# Patient Record
Sex: FEMALE | Race: WHITE | NOT HISPANIC OR LATINO | URBAN - METROPOLITAN AREA
[De-identification: names, ages, dates, MRNs, and addresses within clinical notes are randomized per-mention and may not be internally consistent; named-entity substitution may affect disease eponyms.]

---

## 2020-03-18 ENCOUNTER — APPOINTMENT (RX ONLY)
Dept: URBAN - METROPOLITAN AREA CLINIC 23 | Facility: CLINIC | Age: 35
Setting detail: DERMATOLOGY
End: 2020-03-18

## 2020-03-18 DIAGNOSIS — D485 NEOPLASM OF UNCERTAIN BEHAVIOR OF SKIN: ICD-10-CM

## 2020-03-18 DIAGNOSIS — L82.1 OTHER SEBORRHEIC KERATOSIS: ICD-10-CM

## 2020-03-18 DIAGNOSIS — Z80.8 FAMILY HISTORY OF MALIGNANT NEOPLASM OF OTHER ORGANS OR SYSTEMS: ICD-10-CM

## 2020-03-18 DIAGNOSIS — D22 MELANOCYTIC NEVI: ICD-10-CM

## 2020-03-18 PROBLEM — D48.5 NEOPLASM OF UNCERTAIN BEHAVIOR OF SKIN: Status: ACTIVE | Noted: 2020-03-18

## 2020-03-18 PROBLEM — D22.5 MELANOCYTIC NEVI OF TRUNK: Status: ACTIVE | Noted: 2020-03-18

## 2020-03-18 PROCEDURE — ? PATIENT SPECIFIC COUNSELING

## 2020-03-18 PROCEDURE — 11300 SHAVE SKIN LESION 0.5 CM/<: CPT

## 2020-03-18 PROCEDURE — ? SHAVE REMOVAL

## 2020-03-18 PROCEDURE — ? COUNSELING

## 2020-03-18 PROCEDURE — 11300 SHAVE SKIN LESION 0.5 CM/<: CPT | Mod: 76

## 2020-03-18 PROCEDURE — 99203 OFFICE O/P NEW LOW 30 MIN: CPT | Mod: 25

## 2020-03-18 ASSESSMENT — LOCATION SIMPLE DESCRIPTION DERM
LOCATION SIMPLE: RIGHT UPPER BACK
LOCATION SIMPLE: RIGHT UPPER BACK
LOCATION SIMPLE: CHEST
LOCATION SIMPLE: LEFT UPPER BACK
LOCATION SIMPLE: LEFT CHEEK
LOCATION SIMPLE: CHEST

## 2020-03-18 ASSESSMENT — LOCATION DETAILED DESCRIPTION DERM
LOCATION DETAILED: RIGHT MEDIAL UPPER BACK
LOCATION DETAILED: LEFT SUPERIOR CENTRAL MALAR CHEEK
LOCATION DETAILED: RIGHT MEDIAL UPPER BACK
LOCATION DETAILED: RIGHT SUPERIOR UPPER BACK
LOCATION DETAILED: LEFT SUPERIOR MEDIAL UPPER BACK
LOCATION DETAILED: UPPER STERNUM
LOCATION DETAILED: UPPER STERNUM

## 2020-03-18 ASSESSMENT — LOCATION ZONE DERM
LOCATION ZONE: FACE
LOCATION ZONE: TRUNK
LOCATION ZONE: TRUNK

## 2020-03-18 NOTE — PROCEDURE: SHAVE REMOVAL
Detail Level: Detailed
Medical Necessity Clause: This procedure was medically necessary because the lesion that was treated was:
Hemostasis: Aluminum Chloride
Anesthesia Type: 1% lidocaine with epinephrine
Bill 56641 For Specimen Handling/Conveyance To Laboratory?: no
Medical Necessity Information: It is in your best interest to select a reason for this procedure from the list below. All of these items fulfill various CMS LCD requirements except the new and changing color options.
X Size Of Lesion In Cm (Optional): 0
Wound Care: Vaseline
Notification Instructions: Patient will be notified of biopsy results. However, patient instructed to call the office if not contacted within 2 weeks.
Biopsy Method: Dermablade
Billing Type: Third-Party Bill
Path Notes (To The Dermatopathologist): Please check margins.
Render Post-Care Instructions In Note?: yes
Size Of Lesion In Cm (Required): 0.4
Consent was obtained from the patient. The risks and benefits to therapy were discussed in detail. Specifically, the risks of infection, scarring, bleeding, prolonged wound healing, incomplete removal, allergy to anesthesia, nerve injury and recurrence were addressed. Prior to the procedure, the treatment site was clearly identified and confirmed by the patient. All components of Universal Protocol/PAUSE Rule completed.
Post-Care Instructions: I reviewed with the patient in detail post-care instructions. Patient is to keep the biopsy site dry overnight, and then apply Vaseline twice daily until healed. Patient may apply dilute white vinegar (1:10; white vinegar:water) soaks to remove any crusting.
Size Of Lesion In Cm (Required): 0.5

## 2020-03-18 NOTE — PROCEDURE: PATIENT SPECIFIC COUNSELING
Detail Level: Zone
Discussed removal vs monitoring
Can treat as cosmetic but risk of pigment change.
Shave removal on neck was discussed but she declines

## 2020-03-30 ENCOUNTER — APPOINTMENT (RX ONLY)
Dept: URBAN - METROPOLITAN AREA CLINIC 23 | Facility: CLINIC | Age: 35
Setting detail: DERMATOLOGY
End: 2020-03-30

## 2020-03-30 DIAGNOSIS — D22 MELANOCYTIC NEVI: ICD-10-CM

## 2020-03-30 PROBLEM — D22.5 MELANOCYTIC NEVI OF TRUNK: Status: ACTIVE | Noted: 2020-03-30

## 2020-03-30 PROCEDURE — 12032 INTMD RPR S/A/T/EXT 2.6-7.5: CPT

## 2020-03-30 PROCEDURE — ? EXCISION

## 2020-03-30 PROCEDURE — 11402 EXC TR-EXT B9+MARG 1.1-2 CM: CPT

## 2020-03-30 ASSESSMENT — LOCATION DETAILED DESCRIPTION DERM: LOCATION DETAILED: RIGHT MEDIAL UPPER BACK

## 2020-03-30 ASSESSMENT — LOCATION SIMPLE DESCRIPTION DERM: LOCATION SIMPLE: RIGHT UPPER BACK

## 2020-03-30 ASSESSMENT — LOCATION ZONE DERM: LOCATION ZONE: TRUNK

## 2020-03-30 NOTE — PROCEDURE: EXCISION
Wound Care: Vaseline
Ftsg Text: The defect edges were debeveled with a #15 scalpel blade.  Given the location of the defect, shape of the defect and the proximity to free margins a full thickness skin graft was deemed most appropriate.  Using a sterile surgical marker, the primary defect shape was transferred to the donor site. The area thus outlined was incised deep to adipose tissue with a #15 scalpel blade.  The harvested graft was then trimmed of adipose tissue until only dermis and epidermis was left.  The skin margins of the secondary defect were undermined to an appropriate distance in all directions utilizing iris scissors.  The secondary defect was closed with interrupted buried subcutaneous sutures.  The skin edges were then re-apposed with running  sutures.  The skin graft was then placed in the primary defect and oriented appropriately.
Nostril Rim Text: The closure involved the nostril rim.
Rotation Flap Text: The defect edges were debeveled with a #15 scalpel blade.  Given the location of the defect, shape of the defect and the proximity to free margins a rotation flap was deemed most appropriate.  Using a sterile surgical marker, an appropriate rotation flap was drawn incorporating the defect and placing the expected incisions within the relaxed skin tension lines where possible.    The area thus outlined was incised deep to adipose tissue with a #15 scalpel blade.  The skin margins were undermined to an appropriate distance in all directions utilizing iris scissors.
Information: Selecting Yes will display possible errors in your note based on the variables you have selected. This validation is only offered as a suggestion for you. PLEASE NOTE THAT THE VALIDATION TEXT WILL BE REMOVED WHEN YOU FINALIZE YOUR NOTE. IF YOU WANT TO FAX A PRELIMINARY NOTE YOU WILL NEED TO TOGGLE THIS TO 'NO' IF YOU DO NOT WANT IT IN YOUR FAXED NOTE.
Secondary Defect Length (In Cm): 0
Estimated Blood Loss (Cc): minimal
Add Nub Associated Diagnoses If Applicable When Selecting Medical Necessity: No
Rhomboid Transposition Flap Text: The defect edges were debeveled with a #15 scalpel blade.  Given the location of the defect and the proximity to free margins a rhomboid transposition flap was deemed most appropriate.  Using a sterile surgical marker, an appropriate rhomboid flap was drawn incorporating the defect.    The area thus outlined was incised deep to adipose tissue with a #15 scalpel blade.  The skin margins were undermined to an appropriate distance in all directions utilizing iris scissors.
Tissue Cultured Epidermal Autograft Text: The defect edges were debeveled with a #15 scalpel blade.  Given the location of the defect, shape of the defect and the proximity to free margins a tissue cultured epidermal autograft was deemed most appropriate.  The graft was then trimmed to fit the size of the defect.  The graft was then placed in the primary defect and oriented appropriately.
Additional Anesthesia Volume In Cc: 6
Crescentic Intermediate Repair Preamble Text (Leave Blank If You Do Not Want): Undermining was performed with blunt dissection.
Complex Repair And A-T Advancement Flap Text: The defect edges were debeveled with a #15 scalpel blade.  The primary defect was closed partially with a complex linear closure.  Given the location of the remaining defect, shape of the defect and the proximity to free margins an A-T advancement flap was deemed most appropriate for complete closure of the defect.  Using a sterile surgical marker, an appropriate advancement flap was drawn incorporating the defect and placing the expected incisions within the relaxed skin tension lines where possible.    The area thus outlined was incised deep to adipose tissue with a #15 scalpel blade.  The skin margins were undermined to an appropriate distance in all directions utilizing iris scissors.
Bilobed Transposition Flap Text: The defect edges were debeveled with a #15 scalpel blade.  Given the location of the defect and the proximity to free margins a bilobed transposition flap was deemed most appropriate.  Using a sterile surgical marker, an appropriate bilobe flap drawn around the defect.    The area thus outlined was incised deep to adipose tissue with a #15 scalpel blade.  The skin margins were undermined to an appropriate distance in all directions utilizing iris scissors.
Medical Necessity Clause: This procedure was medically necessary because the lesion that was treated was:
Repair Performed By Another Provider Text (Leave Blank If You Do Not Want): After the tissue was excised the defect was repaired by another provider.
Complex Repair And W Plasty Text: The defect edges were debeveled with a #15 scalpel blade.  The primary defect was closed partially with a complex linear closure.  Given the location of the remaining defect, shape of the defect and the proximity to free margins a W plasty was deemed most appropriate for complete closure of the defect.  Using a sterile surgical marker, an appropriate advancement flap was drawn incorporating the defect and placing the expected incisions within the relaxed skin tension lines where possible.    The area thus outlined was incised deep to adipose tissue with a #15 scalpel blade.  The skin margins were undermined to an appropriate distance in all directions utilizing iris scissors.
Graft Donor Site Bandage (Optional-Leave Blank If You Don't Want In Note): Steri-strips and a pressure bandage were applied to the donor site.
Keystone Flap Text: The defect edges were debeveled with a #15 scalpel blade.  Given the location of the defect, shape of the defect a keystone flap was deemed most appropriate.  Using a sterile surgical marker, an appropriate keystone flap was drawn incorporating the defect, outlining the appropriate donor tissue and placing the expected incisions within the relaxed skin tension lines where possible. The area thus outlined was incised deep to adipose tissue with a #15 scalpel blade.  The skin margins were undermined to an appropriate distance in all directions around the primary defect and laterally outward around the flap utilizing iris scissors.
Complex Repair And Transposition Flap Text: The defect edges were debeveled with a #15 scalpel blade.  The primary defect was closed partially with a complex linear closure.  Given the location of the remaining defect, shape of the defect and the proximity to free margins a transposition flap was deemed most appropriate for complete closure of the defect.  Using a sterile surgical marker, an appropriate advancement flap was drawn incorporating the defect and placing the expected incisions within the relaxed skin tension lines where possible.    The area thus outlined was incised deep to adipose tissue with a #15 scalpel blade.  The skin margins were undermined to an appropriate distance in all directions utilizing iris scissors.
Home Suture Removal Text: Patient was provided a home suture removal kit and will remove their sutures at home.  If they have any questions or difficulties they will call the office.
A-T Advancement Flap Text: The defect edges were debeveled with a #15 scalpel blade.  Given the location of the defect, shape of the defect and the proximity to free margins an A-T advancement flap was deemed most appropriate.  Using a sterile surgical marker, an appropriate advancement flap was drawn incorporating the defect and placing the expected incisions within the relaxed skin tension lines where possible.    The area thus outlined was incised deep to adipose tissue with a #15 scalpel blade.  The skin margins were undermined to an appropriate distance in all directions utilizing iris scissors.
Medical Necessity Information: It is in your best interest to select a reason for this procedure from the list below. All of these items fulfill various CMS LCD requirements except lesion extends to a margin.
Transposition Flap Text: The defect edges were debeveled with a #15 scalpel blade.  Given the location of the defect and the proximity to free margins a transposition flap was deemed most appropriate.  Using a sterile surgical marker, an appropriate transposition flap was drawn incorporating the defect.    The area thus outlined was incised deep to adipose tissue with a #15 scalpel blade.  The skin margins were undermined to an appropriate distance in all directions utilizing iris scissors.
Composite Graft Text: The defect edges were debeveled with a #15 scalpel blade.  Given the location of the defect, shape of the defect, the proximity to free margins and the fact the defect was full thickness a composite graft was deemed most appropriate.  The defect was outline and then transferred to the donor site.  A full thickness graft was then excised from the donor site. The graft was then placed in the primary defect, oriented appropriately and then sutured into place.  The secondary defect was then repaired using a primary closure.
Show Accession Variable: Yes
Bilateral Helical Rim Advancement Flap Text: The defect edges were debeveled with a #15 blade scalpel.  Given the location of the defect and the proximity to free margins (helical rim) a bilateral helical rim advancement flap was deemed most appropriate.  Using a sterile surgical marker, the appropriate advancement flaps were drawn incorporating the defect and placing the expected incisions between the helical rim and antihelix where possible.  The area thus outlined was incised through and through with a #15 scalpel blade.  With a skin hook and iris scissors, the flaps were gently and sharply undermined and freed up.
Crescentic Complex Repair Preamble Text (Leave Blank If You Do Not Want): Extensive wide undermining was performed.
Slit Excision Additional Text (Leave Blank If You Do Not Want): A linear line was drawn on the skin overlying the lesion. An incision was made slowly until the lesion was visualized.  Once visualized, the lesion was removed with blunt dissection.
Purse String (Simple) Text: Given the location of the defect and the characteristics of the surrounding skin a purse string simple closure was deemed most appropriate.  Undermining was performed circumferentially around the surgical defect.  A purse string suture was then placed and tightened.
Advancement Flap (Double) Text: The defect edges were debeveled with a #15 scalpel blade.  Given the location of the defect and the proximity to free margins a double advancement flap was deemed most appropriate.  Using a sterile surgical marker, the appropriate advancement flaps were drawn incorporating the defect and placing the expected incisions within the relaxed skin tension lines where possible.    The area thus outlined was incised deep to adipose tissue with a #15 scalpel blade.  The skin margins were undermined to an appropriate distance in all directions utilizing iris scissors.
Double O-Z Plasty Text: The defect edges were debeveled with a #15 scalpel blade.  Given the location of the defect, shape of the defect and the proximity to free margins a Double O-Z plasty (double transposition flap) was deemed most appropriate.  Using a sterile surgical marker, the appropriate transposition flaps were drawn incorporating the defect and placing the expected incisions within the relaxed skin tension lines where possible. The area thus outlined was incised deep to adipose tissue with a #15 scalpel blade.  The skin margins were undermined to an appropriate distance in all directions utilizing iris scissors.  Hemostasis was achieved with electrocautery.  The flaps were then transposed into place, one clockwise and the other counterclockwise, and anchored with interrupted buried subcutaneous sutures.
Complex Repair And Ftsg Text: The defect edges were debeveled with a #15 scalpel blade.  The primary defect was closed partially with a complex linear closure.  Given the location of the defect, shape of the defect and the proximity to free margins a full thickness skin graft was deemed most appropriate to repair the remaining defect.  The graft was trimmed to fit the size of the remaining defect.  The graft was then placed in the primary defect, oriented appropriately, and sutured into place.
Island Pedicle Flap Text: The defect edges were debeveled with a #15 scalpel blade.  Given the location of the defect, shape of the defect and the proximity to free margins an island pedicle advancement flap was deemed most appropriate.  Using a sterile surgical marker, an appropriate advancement flap was drawn incorporating the defect, outlining the appropriate donor tissue and placing the expected incisions within the relaxed skin tension lines where possible.    The area thus outlined was incised deep to adipose tissue with a #15 scalpel blade.  The skin margins were undermined to an appropriate distance in all directions around the primary defect and laterally outward around the island pedicle utilizing iris scissors.  There was minimal undermining beneath the pedicle flap.
Anesthesia Type: 1% lidocaine with 1:100,000 epinephrine and 408mcg clindamycin/ml and a 1:10 solution of 8.4% sodium bicarbonate
Complex Repair And Bilobe Flap Text: The defect edges were debeveled with a #15 scalpel blade.  The primary defect was closed partially with a complex linear closure.  Given the location of the remaining defect, shape of the defect and the proximity to free margins a bilobe flap was deemed most appropriate for complete closure of the defect.  Using a sterile surgical marker, an appropriate advancement flap was drawn incorporating the defect and placing the expected incisions within the relaxed skin tension lines where possible.    The area thus outlined was incised deep to adipose tissue with a #15 scalpel blade.  The skin margins were undermined to an appropriate distance in all directions utilizing iris scissors.
Cheek-To-Nose Interpolation Flap Text: A decision was made to reconstruct the defect utilizing an interpolation axial flap and a staged reconstruction.  A telfa template was made of the defect.  This telfa template was then used to outline the Cheek-To-Nose Interpolation flap.  The donor area for the pedicle flap was then injected with anesthesia.  The flap was excised through the skin and subcutaneous tissue down to the layer of the underlying musculature.  The interpolation flap was carefully excised within this deep plane to maintain its blood supply.  The edges of the donor site were undermined.   The donor site was closed in a primary fashion.  The pedicle was then rotated into position and sutured.  Once the tube was sutured into place, adequate blood supply was confirmed with blanching and refill.  The pedicle was then wrapped with xeroform gauze and dressed appropriately with a telfa and gauze bandage to ensure continued blood supply and protect the attached pedicle.
O-Z Flap Text: The defect edges were debeveled with a #15 scalpel blade.  Given the location of the defect, shape of the defect and the proximity to free margins an O-Z flap was deemed most appropriate.  Using a sterile surgical marker, an appropriate transposition flap was drawn incorporating the defect and placing the expected incisions within the relaxed skin tension lines where possible. The area thus outlined was incised deep to adipose tissue with a #15 scalpel blade.  The skin margins were undermined to an appropriate distance in all directions utilizing iris scissors.
Epidermal Closure Graft Donor Site (Optional): simple interrupted
Rhombic Flap Text: The defect edges were debeveled with a #15 scalpel blade.  Given the location of the defect and the proximity to free margins a rhombic flap was deemed most appropriate.  Using a sterile surgical marker, an appropriate rhombic flap was drawn incorporating the defect.    The area thus outlined was incised deep to adipose tissue with a #15 scalpel blade.  The skin margins were undermined to an appropriate distance in all directions utilizing iris scissors.
Skin Substitute Text: The defect edges were debeveled with a #15 scalpel blade.  Given the location of the defect, shape of the defect and the proximity to free margins a skin substitute graft was deemed most appropriate.  The graft material was trimmed to fit the size of the defect. The graft was then placed in the primary defect and oriented appropriately.
Billing Type: Third-Party Bill
Bilobed Flap Text: The defect edges were debeveled with a #15 scalpel blade.  Given the location of the defect and the proximity to free margins a bilobe flap was deemed most appropriate.  Using a sterile surgical marker, an appropriate bilobe flap drawn around the defect.    The area thus outlined was incised deep to adipose tissue with a #15 scalpel blade.  The skin margins were undermined to an appropriate distance in all directions utilizing iris scissors.
Complex Repair And Modified Advancement Flap Text: The defect edges were debeveled with a #15 scalpel blade.  The primary defect was closed partially with a complex linear closure.  Given the location of the remaining defect, shape of the defect and the proximity to free margins a modified advancement flap was deemed most appropriate for complete closure of the defect.  Using a sterile surgical marker, an appropriate advancement flap was drawn incorporating the defect and placing the expected incisions within the relaxed skin tension lines where possible.    The area thus outlined was incised deep to adipose tissue with a #15 scalpel blade.  The skin margins were undermined to an appropriate distance in all directions utilizing iris scissors.
Complex Repair And Double M Plasty Text: The defect edges were debeveled with a #15 scalpel blade.  The primary defect was closed partially with a complex linear closure.  Given the location of the remaining defect, shape of the defect and the proximity to free margins a double M plasty was deemed most appropriate for complete closure of the defect.  Using a sterile surgical marker, an appropriate advancement flap was drawn incorporating the defect and placing the expected incisions within the relaxed skin tension lines where possible.    The area thus outlined was incised deep to adipose tissue with a #15 scalpel blade.  The skin margins were undermined to an appropriate distance in all directions utilizing iris scissors.
Island Pedicle Flap-Requiring Vessel Identification Text: The defect edges were debeveled with a #15 scalpel blade.  Given the location of the defect, shape of the defect and the proximity to free margins an island pedicle advancement flap was deemed most appropriate.  Using a sterile surgical marker, an appropriate advancement flap was drawn, based on the axial vessel mentioned above, incorporating the defect, outlining the appropriate donor tissue and placing the expected incisions within the relaxed skin tension lines where possible.    The area thus outlined was incised deep to adipose tissue with a #15 scalpel blade.  The skin margins were undermined to an appropriate distance in all directions around the primary defect and laterally outward around the island pedicle utilizing iris scissors.  There was minimal undermining beneath the pedicle flap.
Crescentic Advancement Flap Text: The defect edges were debeveled with a #15 scalpel blade.  Given the location of the defect and the proximity to free margins a crescentic advancement flap was deemed most appropriate.  Using a sterile surgical marker, the appropriate advancement flap was drawn incorporating the defect and placing the expected incisions within the relaxed skin tension lines where possible.    The area thus outlined was incised deep to adipose tissue with a #15 scalpel blade.  The skin margins were undermined to an appropriate distance in all directions utilizing iris scissors.
Post-Care Instructions: I reviewed with the patient in detail post-care instructions. Patient is not to engage in any heavy lifting, exercise, or swimming for the next 14 days. Should the patient develop any fevers, chills, bleeding, severe pain patient will contact the office immediately.
Excision Method: Elliptical
Complex Repair And Rhombic Flap Text: The defect edges were debeveled with a #15 scalpel blade.  The primary defect was closed partially with a complex linear closure.  Given the location of the remaining defect, shape of the defect and the proximity to free margins a rhombic flap was deemed most appropriate for complete closure of the defect.  Using a sterile surgical marker, an appropriate advancement flap was drawn incorporating the defect and placing the expected incisions within the relaxed skin tension lines where possible.    The area thus outlined was incised deep to adipose tissue with a #15 scalpel blade.  The skin margins were undermined to an appropriate distance in all directions utilizing iris scissors.
Undermining Type: Entire Wound
Debridement Text: The wound edges were debrided prior to proceeding with the closure to facilitate wound healing.
H Plasty Text: Given the location of the defect, shape of the defect and the proximity to free margins a H-plasty was deemed most appropriate for repair.  Using a sterile surgical marker, the appropriate advancement arms of the H-plasty were drawn incorporating the defect and placing the expected incisions within the relaxed skin tension lines where possible. The area thus outlined was incised deep to adipose tissue with a #15 scalpel blade. The skin margins were undermined to an appropriate distance in all directions utilizing iris scissors.  The opposing advancement arms were then advanced into place in opposite direction and anchored with interrupted buried subcutaneous sutures.
Complex Repair And Dermal Autograft Text: The defect edges were debeveled with a #15 scalpel blade.  The primary defect was closed partially with a complex linear closure.  Given the location of the defect, shape of the defect and the proximity to free margins an dermal autograft was deemed most appropriate to repair the remaining defect.  The graft was trimmed to fit the size of the remaining defect.  The graft was then placed in the primary defect, oriented appropriately, and sutured into place.
Scalpel Size: 15 blade
Mercedes Flap Text: The defect edges were debeveled with a #15 scalpel blade.  Given the location of the defect, shape of the defect and the proximity to free margins a Mercedes flap was deemed most appropriate.  Using a sterile surgical marker, an appropriate advancement flap was drawn incorporating the defect and placing the expected incisions within the relaxed skin tension lines where possible. The area thus outlined was incised deep to adipose tissue with a #15 scalpel blade.  The skin margins were undermined to an appropriate distance in all directions utilizing iris scissors.
Anesthesia Type: 1% lidocaine with epinephrine
Mastoid Interpolation Flap Text: A decision was made to reconstruct the defect utilizing an interpolation axial flap and a staged reconstruction.  A telfa template was made of the defect.  This telfa template was then used to outline the mastoid interpolation flap.  The donor area for the pedicle flap was then injected with anesthesia.  The flap was excised through the skin and subcutaneous tissue down to the layer of the underlying musculature.  The pedicle flap was carefully excised within this deep plane to maintain its blood supply.  The edges of the donor site were undermined.   The donor site was closed in a primary fashion.  The pedicle was then rotated into position and sutured.  Once the tube was sutured into place, adequate blood supply was confirmed with blanching and refill.  The pedicle was then wrapped with xeroform gauze and dressed appropriately with a telfa and gauze bandage to ensure continued blood supply and protect the attached pedicle.
Purse String (Intermediate) Text: Given the location of the defect and the characteristics of the surrounding skin a purse string intermediate closure was deemed most appropriate.  Undermining was performed circumferentially around the surgical defect.  A purse string suture was then placed and tightened.
Saucerization Excision Additional Text (Leave Blank If You Do Not Want): The margin was drawn around the clinically apparent lesion.  Incisions were then made along these lines, in a tangential fashion, to the appropriate tissue plane and the lesion was extirpated.
Helical Rim Advancement Flap Text: The defect edges were debeveled with a #15 blade scalpel.  Given the location of the defect and the proximity to free margins (helical rim) a double helical rim advancement flap was deemed most appropriate.  Using a sterile surgical marker, the appropriate advancement flaps were drawn incorporating the defect and placing the expected incisions between the helical rim and antihelix where possible.  The area thus outlined was incised through and through with a #15 scalpel blade.  With a skin hook and iris scissors, the flaps were gently and sharply undermined and freed up.
Size Of Lesion In Cm: 0.6
Complex Repair And O-L Flap Text: The defect edges were debeveled with a #15 scalpel blade.  The primary defect was closed partially with a complex linear closure.  Given the location of the remaining defect, shape of the defect and the proximity to free margins an O-L flap was deemed most appropriate for complete closure of the defect.  Using a sterile surgical marker, an appropriate flap was drawn incorporating the defect and placing the expected incisions within the relaxed skin tension lines where possible.    The area thus outlined was incised deep to adipose tissue with a #15 scalpel blade.  The skin margins were undermined to an appropriate distance in all directions utilizing iris scissors.
Retention Suture Text: Retention sutures were placed to support the closure and prevent dehiscence.
Dorsal Nasal Flap Text: The defect edges were debeveled with a #15 scalpel blade.  Given the location of the defect and the proximity to free margins a dorsal nasal flap was deemed most appropriate.  Using a sterile surgical marker, an appropriate dorsal nasal flap was drawn around the defect.    The area thus outlined was incised deep to adipose tissue with a #15 scalpel blade.  The skin margins were undermined to an appropriate distance in all directions utilizing iris scissors.
Advancement Flap (Single) Text: The defect edges were debeveled with a #15 scalpel blade.  Given the location of the defect and the proximity to free margins a single advancement flap was deemed most appropriate.  Using a sterile surgical marker, an appropriate advancement flap was drawn incorporating the defect and placing the expected incisions within the relaxed skin tension lines where possible.    The area thus outlined was incised deep to adipose tissue with a #15 scalpel blade.  The skin margins were undermined to an appropriate distance in all directions utilizing iris scissors.
Complex Repair And Dorsal Nasal Flap Text: The defect edges were debeveled with a #15 scalpel blade.  The primary defect was closed partially with a complex linear closure.  Given the location of the remaining defect, shape of the defect and the proximity to free margins a dorsal nasal flap was deemed most appropriate for complete closure of the defect.  Using a sterile surgical marker, an appropriate flap was drawn incorporating the defect and placing the expected incisions within the relaxed skin tension lines where possible.    The area thus outlined was incised deep to adipose tissue with a #15 scalpel blade.  The skin margins were undermined to an appropriate distance in all directions utilizing iris scissors.
O-Z Plasty Text: The defect edges were debeveled with a #15 scalpel blade.  Given the location of the defect, shape of the defect and the proximity to free margins an O-Z plasty (double transposition flap) was deemed most appropriate.  Using a sterile surgical marker, the appropriate transposition flaps were drawn incorporating the defect and placing the expected incisions within the relaxed skin tension lines where possible.    The area thus outlined was incised deep to adipose tissue with a #15 scalpel blade.  The skin margins were undermined to an appropriate distance in all directions utilizing iris scissors.  Hemostasis was achieved with electrocautery.  The flaps were then transposed into place, one clockwise and the other counterclockwise, and anchored with interrupted buried subcutaneous sutures.
O-L Flap Text: The defect edges were debeveled with a #15 scalpel blade.  Given the location of the defect, shape of the defect and the proximity to free margins an O-L flap was deemed most appropriate.  Using a sterile surgical marker, an appropriate advancement flap was drawn incorporating the defect and placing the expected incisions within the relaxed skin tension lines where possible.    The area thus outlined was incised deep to adipose tissue with a #15 scalpel blade.  The skin margins were undermined to an appropriate distance in all directions utilizing iris scissors.
Complex Repair And Skin Substitute Graft Text: The defect edges were debeveled with a #15 scalpel blade.  The primary defect was closed partially with a complex linear closure.  Given the location of the remaining defect, shape of the defect and the proximity to free margins a skin substitute graft was deemed most appropriate to repair the remaining defect.  The graft was trimmed to fit the size of the remaining defect.  The graft was then placed in the primary defect, oriented appropriately, and sutured into place.
Hemostasis: Electrocautery
Cheek Interpolation Flap Text: A decision was made to reconstruct the defect utilizing an interpolation axial flap and a staged reconstruction.  A telfa template was made of the defect.  This telfa template was then used to outline the Cheek Interpolation flap.  The donor area for the pedicle flap was then injected with anesthesia.  The flap was excised through the skin and subcutaneous tissue down to the layer of the underlying musculature.  The interpolation flap was carefully excised within this deep plane to maintain its blood supply.  The edges of the donor site were undermined.   The donor site was closed in a primary fashion.  The pedicle was then rotated into position and sutured.  Once the tube was sutured into place, adequate blood supply was confirmed with blanching and refill.  The pedicle was then wrapped with xeroform gauze and dressed appropriately with a telfa and gauze bandage to ensure continued blood supply and protect the attached pedicle.
Intermediate / Complex Repair - Final Wound Length In Cm: 3.3
Hatchet Flap Text: The defect edges were debeveled with a #15 scalpel blade.  Given the location of the defect, shape of the defect and the proximity to free margins a hatchet flap was deemed most appropriate.  Using a sterile surgical marker, an appropriate hatchet flap was drawn incorporating the defect and placing the expected incisions within the relaxed skin tension lines where possible.    The area thus outlined was incised deep to adipose tissue with a #15 scalpel blade.  The skin margins were undermined to an appropriate distance in all directions utilizing iris scissors.
Lip Wedge Excision Repair Text: Given the location of the defect and the proximity to free margins a full thickness wedge repair was deemed most appropriate.  Using a sterile surgical marker, the appropriate repair was drawn incorporating the defect and placing the expected incisions perpendicular to the vermilion border.  The vermilion border was also meticulously outlined to ensure appropriate reapproximation during the repair.  The area thus outlined was incised through and through with a #15 scalpel blade.  The muscularis and dermis were reaproximated with deep sutures following hemostasis. Care was taken to realign the vermilion border before proceeding with the superficial closure.  Once the vermilion was realigned the superfical and mucosal closure was finished.
Vermilion Border Text: The closure involved the vermilion border.
Complex Repair And Double Advancement Flap Text: The defect edges were debeveled with a #15 scalpel blade.  The primary defect was closed partially with a complex linear closure.  Given the location of the remaining defect, shape of the defect and the proximity to free margins a double advancement flap was deemed most appropriate for complete closure of the defect.  Using a sterile surgical marker, an appropriate advancement flap was drawn incorporating the defect and placing the expected incisions within the relaxed skin tension lines where possible.    The area thus outlined was incised deep to adipose tissue with a #15 scalpel blade.  The skin margins were undermined to an appropriate distance in all directions utilizing iris scissors.
Complex Repair And M Plasty Text: The defect edges were debeveled with a #15 scalpel blade.  The primary defect was closed partially with a complex linear closure.  Given the location of the remaining defect, shape of the defect and the proximity to free margins an M plasty was deemed most appropriate for complete closure of the defect.  Using a sterile surgical marker, an appropriate advancement flap was drawn incorporating the defect and placing the expected incisions within the relaxed skin tension lines where possible.    The area thus outlined was incised deep to adipose tissue with a #15 scalpel blade.  The skin margins were undermined to an appropriate distance in all directions utilizing iris scissors.
Double Island Pedicle Flap Text: The defect edges were debeveled with a #15 scalpel blade.  Given the location of the defect, shape of the defect and the proximity to free margins a double island pedicle advancement flap was deemed most appropriate.  Using a sterile surgical marker, an appropriate advancement flap was drawn incorporating the defect, outlining the appropriate donor tissue and placing the expected incisions within the relaxed skin tension lines where possible.    The area thus outlined was incised deep to adipose tissue with a #15 scalpel blade.  The skin margins were undermined to an appropriate distance in all directions around the primary defect and laterally outward around the island pedicle utilizing iris scissors.  There was minimal undermining beneath the pedicle flap.
Perilesional Excision Additional Text (Leave Blank If You Do Not Want): The margin was drawn around the clinically apparent lesion. Incisions were then made along these lines to the appropriate tissue plane and the lesion was extirpated.
Banner Transposition Flap Text: The defect edges were debeveled with a #15 scalpel blade.  Given the location of the defect and the proximity to free margins a Banner transposition flap was deemed most appropriate.  Using a sterile surgical marker, an appropriate flap drawn around the defect. The area thus outlined was incised deep to adipose tissue with a #15 scalpel blade.  The skin margins were undermined to an appropriate distance in all directions utilizing iris scissors.
Chonodrocutaneous Helical Advancement Flap Text: The defect edges were debeveled with a #15 scalpel blade.  Given the location of the defect and the proximity to free margins a chondrocutaneous helical advancement flap was deemed most appropriate.  Using a sterile surgical marker, the appropriate advancement flap was drawn incorporating the defect and placing the expected incisions within the relaxed skin tension lines where possible.    The area thus outlined was incised deep to adipose tissue with a #15 scalpel blade.  The skin margins were undermined to an appropriate distance in all directions utilizing iris scissors.
Complex Repair And Rotation Flap Text: The defect edges were debeveled with a #15 scalpel blade.  The primary defect was closed partially with a complex linear closure.  Given the location of the remaining defect, shape of the defect and the proximity to free margins a rotation flap was deemed most appropriate for complete closure of the defect.  Using a sterile surgical marker, an appropriate advancement flap was drawn incorporating the defect and placing the expected incisions within the relaxed skin tension lines where possible.    The area thus outlined was incised deep to adipose tissue with a #15 scalpel blade.  The skin margins were undermined to an appropriate distance in all directions utilizing iris scissors.
V-Y Plasty Text: The defect edges were debeveled with a #15 scalpel blade.  Given the location of the defect, shape of the defect and the proximity to free margins an V-Y advancement flap was deemed most appropriate.  Using a sterile surgical marker, an appropriate advancement flap was drawn incorporating the defect and placing the expected incisions within the relaxed skin tension lines where possible.    The area thus outlined was incised deep to adipose tissue with a #15 scalpel blade.  The skin margins were undermined to an appropriate distance in all directions utilizing iris scissors.
Complex Repair And Epidermal Autograft Text: The defect edges were debeveled with a #15 scalpel blade.  The primary defect was closed partially with a complex linear closure.  Given the location of the defect, shape of the defect and the proximity to free margins an epidermal autograft was deemed most appropriate to repair the remaining defect.  The graft was trimmed to fit the size of the remaining defect.  The graft was then placed in the primary defect, oriented appropriately, and sutured into place.
Melolabial Interpolation Flap Text: A decision was made to reconstruct the defect utilizing an interpolation axial flap and a staged reconstruction.  A telfa template was made of the defect.  This telfa template was then used to outline the melolabial interpolation flap.  The donor area for the pedicle flap was then injected with anesthesia.  The flap was excised through the skin and subcutaneous tissue down to the layer of the underlying musculature.  The pedicle flap was carefully excised within this deep plane to maintain its blood supply.  The edges of the donor site were undermined.   The donor site was closed in a primary fashion.  The pedicle was then rotated into position and sutured.  Once the tube was sutured into place, adequate blood supply was confirmed with blanching and refill.  The pedicle was then wrapped with xeroform gauze and dressed appropriately with a telfa and gauze bandage to ensure continued blood supply and protect the attached pedicle.
Repair Type: Intermediate
V-Y Flap Text: The defect edges were debeveled with a #15 scalpel blade.  Given the location of the defect, shape of the defect and the proximity to free margins a V-Y flap was deemed most appropriate.  Using a sterile surgical marker, an appropriate advancement flap was drawn incorporating the defect and placing the expected incisions within the relaxed skin tension lines where possible.    The area thus outlined was incised deep to adipose tissue with a #15 scalpel blade.  The skin margins were undermined to an appropriate distance in all directions utilizing iris scissors.
Cartilage Graft Text: The defect edges were debeveled with a #15 scalpel blade.  Given the location of the defect, shape of the defect, the fact the defect involved a full thickness cartilage defect a cartilage graft was deemed most appropriate.  An appropriate donor site was identified, cleansed, and anesthetized. The cartilage graft was then harvested and transferred to the recipient site, oriented appropriately and then sutured into place.  The secondary defect was then repaired using a primary closure.
Star Wedge Flap Text: The defect edges were debeveled with a #15 scalpel blade.  Given the location of the defect, shape of the defect and the proximity to free margins a star wedge flap was deemed most appropriate.  Using a sterile surgical marker, an appropriate rotation flap was drawn incorporating the defect and placing the expected incisions within the relaxed skin tension lines where possible. The area thus outlined was incised deep to adipose tissue with a #15 scalpel blade.  The skin margins were undermined to an appropriate distance in all directions utilizing iris scissors.
Complex Repair And Z Plasty Text: The defect edges were debeveled with a #15 scalpel blade.  The primary defect was closed partially with a complex linear closure.  Given the location of the remaining defect, shape of the defect and the proximity to free margins a Z plasty was deemed most appropriate for complete closure of the defect.  Using a sterile surgical marker, an appropriate advancement flap was drawn incorporating the defect and placing the expected incisions within the relaxed skin tension lines where possible.    The area thus outlined was incised deep to adipose tissue with a #15 scalpel blade.  The skin margins were undermined to an appropriate distance in all directions utilizing iris scissors.
O-T Plasty Text: The defect edges were debeveled with a #15 scalpel blade.  Given the location of the defect, shape of the defect and the proximity to free margins an O-T plasty was deemed most appropriate.  Using a sterile surgical marker, an appropriate O-T plasty was drawn incorporating the defect and placing the expected incisions within the relaxed skin tension lines where possible.    The area thus outlined was incised deep to adipose tissue with a #15 scalpel blade.  The skin margins were undermined to an appropriate distance in all directions utilizing iris scissors.
Detail Level: Detailed
Complex Repair And O-T Advancement Flap Text: The defect edges were debeveled with a #15 scalpel blade.  The primary defect was closed partially with a complex linear closure.  Given the location of the remaining defect, shape of the defect and the proximity to free margins an O-T advancement flap was deemed most appropriate for complete closure of the defect.  Using a sterile surgical marker, an appropriate advancement flap was drawn incorporating the defect and placing the expected incisions within the relaxed skin tension lines where possible.    The area thus outlined was incised deep to adipose tissue with a #15 scalpel blade.  The skin margins were undermined to an appropriate distance in all directions utilizing iris scissors.
Trilobed Flap Text: The defect edges were debeveled with a #15 scalpel blade.  Given the location of the defect and the proximity to free margins a trilobed flap was deemed most appropriate.  Using a sterile surgical marker, an appropriate trilobed flap drawn around the defect.    The area thus outlined was incised deep to adipose tissue with a #15 scalpel blade.  The skin margins were undermined to an appropriate distance in all directions utilizing iris scissors.
O-T Advancement Flap Text: The defect edges were debeveled with a #15 scalpel blade.  Given the location of the defect, shape of the defect and the proximity to free margins an O-T advancement flap was deemed most appropriate.  Using a sterile surgical marker, an appropriate advancement flap was drawn incorporating the defect and placing the expected incisions within the relaxed skin tension lines where possible.    The area thus outlined was incised deep to adipose tissue with a #15 scalpel blade.  The skin margins were undermined to an appropriate distance in all directions utilizing iris scissors.
Complex Repair And V-Y Plasty Text: The defect edges were debeveled with a #15 scalpel blade.  The primary defect was closed partially with a complex linear closure.  Given the location of the remaining defect, shape of the defect and the proximity to free margins a V-Y plasty was deemed most appropriate for complete closure of the defect.  Using a sterile surgical marker, an appropriate advancement flap was drawn incorporating the defect and placing the expected incisions within the relaxed skin tension lines where possible.    The area thus outlined was incised deep to adipose tissue with a #15 scalpel blade.  The skin margins were undermined to an appropriate distance in all directions utilizing iris scissors.
Complex Repair And Xenograft Text: The defect edges were debeveled with a #15 scalpel blade.  The primary defect was closed partially with a complex linear closure.  Given the location of the defect, shape of the defect and the proximity to free margins a xenograft was deemed most appropriate to repair the remaining defect.  The graft was trimmed to fit the size of the remaining defect.  The graft was then placed in the primary defect, oriented appropriately, and sutured into place.
Z Plasty Text: The lesion was extirpated to the level of the fat with a #15 scalpel blade.  Given the location of the defect, shape of the defect and the proximity to free margins a Z-plasty was deemed most appropriate for repair.  Using a sterile surgical marker, the appropriate transposition arms of the Z-plasty were drawn incorporating the defect and placing the expected incisions within the relaxed skin tension lines where possible.    The area thus outlined was incised deep to adipose tissue with a #15 scalpel blade.  The skin margins were undermined to an appropriate distance in all directions utilizing iris scissors.  The opposing transposition arms were then transposed into place in opposite direction and anchored with interrupted buried subcutaneous sutures.
Where Do You Want The Question To Include Opioid Counseling Located?: Case Summary Tab
Mucosal Advancement Flap Text: Given the location of the defect, shape of the defect and the proximity to free margins a mucosal advancement flap was deemed most appropriate. Incisions were made with a 15 blade scalpel in the appropriate fashion along the cutaneous vermillion border and the mucosal lip. The remaining actinically damaged mucosal tissue was excised.  The mucosal advancement flap was then elevated to the gingival sulcus with care taken to preserve the neurovascular structures and advanced into the primary defect. Care was taken to ensure that precise realignment of the vermilion border was achieved.
Paramedian Forehead Flap Text: A decision was made to reconstruct the defect utilizing an interpolation axial flap and a staged reconstruction.  A telfa template was made of the defect.  This telfa template was then used to outline the paramedian forehead pedicle flap.  The donor area for the pedicle flap was then injected with anesthesia.  The flap was excised through the skin and subcutaneous tissue down to the layer of the underlying musculature.  The pedicle flap was carefully excised within this deep plane to maintain its blood supply.  The edges of the donor site were undermined.   The donor site was closed in a primary fashion.  The pedicle was then rotated into position and sutured.  Once the tube was sutured into place, adequate blood supply was confirmed with blanching and refill.  The pedicle was then wrapped with xeroform gauze and dressed appropriately with a telfa and gauze bandage to ensure continued blood supply and protect the attached pedicle.
Helical Rim Text: The closure involved the helical rim.
Melolabial Transposition Flap Text: The defect edges were debeveled with a #15 scalpel blade.  Given the location of the defect and the proximity to free margins a melolabial flap was deemed most appropriate.  Using a sterile surgical marker, an appropriate melolabial transposition flap was drawn incorporating the defect.    The area thus outlined was incised deep to adipose tissue with a #15 scalpel blade.  The skin margins were undermined to an appropriate distance in all directions utilizing iris scissors.
Dermal Autograft Text: The defect edges were debeveled with a #15 scalpel blade.  Given the location of the defect, shape of the defect and the proximity to free margins a dermal autograft was deemed most appropriate.  Using a sterile surgical marker, the primary defect shape was transferred to the donor site. The area thus outlined was incised deep to adipose tissue with a #15 scalpel blade.  The harvested graft was then trimmed of adipose and epidermal tissue until only dermis was left.  The skin graft was then placed in the primary defect and oriented appropriately.
Excision Depth: adipose tissue
S Plasty Text: Given the location and shape of the defect, and the orientation of relaxed skin tension lines, an S-plasty was deemed most appropriate for repair.  Using a sterile surgical marker, the appropriate outline of the S-plasty was drawn, incorporating the defect and placing the expected incisions within the relaxed skin tension lines where possible.  The area thus outlined was incised deep to adipose tissue with a #15 scalpel blade.  The skin margins were undermined to an appropriate distance in all directions utilizing iris scissors. The skin flaps were advanced over the defect.  The opposing margins were then approximated with interrupted buried subcutaneous sutures.
Complex Repair And Split-Thickness Skin Graft Text: The defect edges were debeveled with a #15 scalpel blade.  The primary defect was closed partially with a complex linear closure.  Given the location of the defect, shape of the defect and the proximity to free margins a split thickness skin graft was deemed most appropriate to repair the remaining defect.  The graft was trimmed to fit the size of the remaining defect.  The graft was then placed in the primary defect, oriented appropriately, and sutured into place.
Double O-Z Flap Text: The defect edges were debeveled with a #15 scalpel blade.  Given the location of the defect, shape of the defect and the proximity to free margins a Double O-Z flap was deemed most appropriate.  Using a sterile surgical marker, an appropriate transposition flap was drawn incorporating the defect and placing the expected incisions within the relaxed skin tension lines where possible. The area thus outlined was incised deep to adipose tissue with a #15 scalpel blade.  The skin margins were undermined to an appropriate distance in all directions utilizing iris scissors.
Interpolation Flap Text: A decision was made to reconstruct the defect utilizing an interpolation axial flap and a staged reconstruction.  A telfa template was made of the defect.  This telfa template was then used to outline the interpolation flap.  The donor area for the pedicle flap was then injected with anesthesia.  The flap was excised through the skin and subcutaneous tissue down to the layer of the underlying musculature.  The interpolation flap was carefully excised within this deep plane to maintain its blood supply.  The edges of the donor site were undermined.   The donor site was closed in a primary fashion.  The pedicle was then rotated into position and sutured.  Once the tube was sutured into place, adequate blood supply was confirmed with blanching and refill.  The pedicle was then wrapped with xeroform gauze and dressed appropriately with a telfa and gauze bandage to ensure continued blood supply and protect the attached pedicle.
Spiral Flap Text: The defect edges were debeveled with a #15 scalpel blade.  Given the location of the defect, shape of the defect and the proximity to free margins a spiral flap was deemed most appropriate.  Using a sterile surgical marker, an appropriate rotation flap was drawn incorporating the defect and placing the expected incisions within the relaxed skin tension lines where possible. The area thus outlined was incised deep to adipose tissue with a #15 scalpel blade.  The skin margins were undermined to an appropriate distance in all directions utilizing iris scissors.
Path Notes (To The Dermatopathologist): Please check margins.
Split-Thickness Skin Graft Text: The defect edges were debeveled with a #15 scalpel blade.  Given the location of the defect, shape of the defect and the proximity to free margins a split thickness skin graft was deemed most appropriate.  Using a sterile surgical marker, the primary defect shape was transferred to the donor site. The split thickness graft was then harvested.  The skin graft was then placed in the primary defect and oriented appropriately.
Suture Removal: 14 days
Epidermal Closure: running subcuticular
Eliptical Excision Additional Text (Leave Blank If You Do Not Want): The margin was drawn around the clinically apparent lesion.  An elliptical shape was then drawn on the skin incorporating the lesion and margins.  Incisions were then made along these lines to the appropriate tissue plane and the lesion was extirpated.
Bi-Rhombic Flap Text: The defect edges were debeveled with a #15 scalpel blade.  Given the location of the defect and the proximity to free margins a bi-rhombic flap was deemed most appropriate.  Using a sterile surgical marker, an appropriate rhombic flap was drawn incorporating the defect. The area thus outlined was incised deep to adipose tissue with a #15 scalpel blade.  The skin margins were undermined to an appropriate distance in all directions utilizing iris scissors.
Dressing: pressure dressing
Xenograft Text: The defect edges were debeveled with a #15 scalpel blade.  Given the location of the defect, shape of the defect and the proximity to free margins a xenograft was deemed most appropriate.  The graft was then trimmed to fit the size of the defect.  The graft was then placed in the primary defect and oriented appropriately.
No Repair - Repaired With Adjacent Surgical Defect Text (Leave Blank If You Do Not Want): After the excision the defect was repaired concurrently with another surgical defect which was in close approximation.
W Plasty Text: The lesion was extirpated to the level of the fat with a #15 scalpel blade.  Given the location of the defect, shape of the defect and the proximity to free margins a W-plasty was deemed most appropriate for repair.  Using a sterile surgical marker, the appropriate transposition arms of the W-plasty were drawn incorporating the defect and placing the expected incisions within the relaxed skin tension lines where possible.    The area thus outlined was incised deep to adipose tissue with a #15 scalpel blade.  The skin margins were undermined to an appropriate distance in all directions utilizing iris scissors.  The opposing transposition arms were then transposed into place in opposite direction and anchored with interrupted buried subcutaneous sutures.
Complex Repair And Tissue Cultured Epidermal Autograft Text: The defect edges were debeveled with a #15 scalpel blade.  The primary defect was closed partially with a complex linear closure.  Given the location of the defect, shape of the defect and the proximity to free margins an tissue cultured epidermal autograft was deemed most appropriate to repair the remaining defect.  The graft was trimmed to fit the size of the remaining defect.  The graft was then placed in the primary defect, oriented appropriately, and sutured into place.
Posterior Auricular Interpolation Flap Text: A decision was made to reconstruct the defect utilizing an interpolation axial flap and a staged reconstruction.  A telfa template was made of the defect.  This telfa template was then used to outline the posterior auricular interpolation flap.  The donor area for the pedicle flap was then injected with anesthesia.  The flap was excised through the skin and subcutaneous tissue down to the layer of the underlying musculature.  The pedicle flap was carefully excised within this deep plane to maintain its blood supply.  The edges of the donor site were undermined.   The donor site was closed in a primary fashion.  The pedicle was then rotated into position and sutured.  Once the tube was sutured into place, adequate blood supply was confirmed with blanching and refill.  The pedicle was then wrapped with xeroform gauze and dressed appropriately with a telfa and gauze bandage to ensure continued blood supply and protect the attached pedicle.
Modified Advancement Flap Text: The defect edges were debeveled with a #15 scalpel blade.  Given the location of the defect, shape of the defect and the proximity to free margins a modified advancement flap was deemed most appropriate.  Using a sterile surgical marker, an appropriate advancement flap was drawn incorporating the defect and placing the expected incisions within the relaxed skin tension lines where possible.    The area thus outlined was incised deep to adipose tissue with a #15 scalpel blade.  The skin margins were undermined to an appropriate distance in all directions utilizing iris scissors.
Deep Sutures: 3-0 PDS
Muscle Hinge Flap Text: The defect edges were debeveled with a #15 scalpel blade.  Given the size, depth and location of the defect and the proximity to free margins a muscle hinge flap was deemed most appropriate.  Using a sterile surgical marker, an appropriate hinge flap was drawn incorporating the defect. The area thus outlined was incised with a #15 scalpel blade.  The skin margins were undermined to an appropriate distance in all directions utilizing iris scissors.
Epidermal Autograft Text: The defect edges were debeveled with a #15 scalpel blade.  Given the location of the defect, shape of the defect and the proximity to free margins an epidermal autograft was deemed most appropriate.  Using a sterile surgical marker, the primary defect shape was transferred to the donor site. The epidermal graft was then harvested.  The skin graft was then placed in the primary defect and oriented appropriately.
Consent was obtained from the patient. The risks and benefits to therapy were discussed in detail. Specifically, the risks of infection, scarring, bleeding, prolonged wound healing, incomplete removal, allergy to anesthesia, nerve injury and recurrence were addressed. Prior to the procedure, the treatment site was clearly identified and confirmed by the patient. All components of Universal Protocol/PAUSE Rule completed.
Excisional Biopsy Additional Text (Leave Blank If You Do Not Want): The margin was drawn around the clinically apparent lesion. An elliptical shape was then drawn on the skin incorporating the lesion and margins.  Incisions were then made along these lines to the appropriate tissue plane and the lesion was extirpated.
Ear Star Wedge Flap Text: The defect edges were debeveled with a #15 blade scalpel.  Given the location of the defect and the proximity to free margins (helical rim) an ear star wedge flap was deemed most appropriate.  Using a sterile surgical marker, the appropriate flap was drawn incorporating the defect and placing the expected incisions between the helical rim and antihelix where possible.  The area thus outlined was incised through and through with a #15 scalpel blade.
Complex Repair And Single Advancement Flap Text: The defect edges were debeveled with a #15 scalpel blade.  The primary defect was closed partially with a complex linear closure.  Given the location of the remaining defect, shape of the defect and the proximity to free margins a single advancement flap was deemed most appropriate for complete closure of the defect.  Using a sterile surgical marker, an appropriate advancement flap was drawn incorporating the defect and placing the expected incisions within the relaxed skin tension lines where possible.    The area thus outlined was incised deep to adipose tissue with a #15 scalpel blade.  The skin margins were undermined to an appropriate distance in all directions utilizing iris scissors.
Alar Island Pedicle Flap Text: The defect edges were debeveled with a #15 scalpel blade.  Given the location of the defect, shape of the defect and the proximity to the alar rim an island pedicle advancement flap was deemed most appropriate.  Using a sterile surgical marker, an appropriate advancement flap was drawn incorporating the defect, outlining the appropriate donor tissue and placing the expected incisions within the nasal ala running parallel to the alar rim. The area thus outlined was incised with a #15 scalpel blade.  The skin margins were undermined minimally to an appropriate distance in all directions around the primary defect and laterally outward around the island pedicle utilizing iris scissors.  There was minimal undermining beneath the pedicle flap.
Island Pedicle Flap With Canthal Suspension Text: The defect edges were debeveled with a #15 scalpel blade.  Given the location of the defect, shape of the defect and the proximity to free margins an island pedicle advancement flap was deemed most appropriate.  Using a sterile surgical marker, an appropriate advancement flap was drawn incorporating the defect, outlining the appropriate donor tissue and placing the expected incisions within the relaxed skin tension lines where possible. The area thus outlined was incised deep to adipose tissue with a #15 scalpel blade.  The skin margins were undermined to an appropriate distance in all directions around the primary defect and laterally outward around the island pedicle utilizing iris scissors.  There was minimal undermining beneath the pedicle flap. A suspension suture was placed in the canthal tendon to prevent tension and prevent ectropion.
Size Of Margin In Cm: 0.3
Complex Repair And Melolabial Flap Text: The defect edges were debeveled with a #15 scalpel blade.  The primary defect was closed partially with a complex linear closure.  Given the location of the remaining defect, shape of the defect and the proximity to free margins a melolabial flap was deemed most appropriate for complete closure of the defect.  Using a sterile surgical marker, an appropriate advancement flap was drawn incorporating the defect and placing the expected incisions within the relaxed skin tension lines where possible.    The area thus outlined was incised deep to adipose tissue with a #15 scalpel blade.  The skin margins were undermined to an appropriate distance in all directions utilizing iris scissors.
Fusiform Excision Additional Text (Leave Blank If You Do Not Want): The margin was drawn around the clinically apparent lesion.  A fusiform shape was then drawn on the skin incorporating the lesion and margins.  Incisions were then made along these lines to the appropriate tissue plane and the lesion was extirpated.
Burow's Advancement Flap Text: The defect edges were debeveled with a #15 scalpel blade.  Given the location of the defect and the proximity to free margins a Burow's advancement flap was deemed most appropriate.  Using a sterile surgical marker, the appropriate advancement flap was drawn incorporating the defect and placing the expected incisions within the relaxed skin tension lines where possible.    The area thus outlined was incised deep to adipose tissue with a #15 scalpel blade.  The skin margins were undermined to an appropriate distance in all directions utilizing iris scissors.
Accession #: [id number]

## 2021-04-12 ENCOUNTER — APPOINTMENT (RX ONLY)
Dept: URBAN - METROPOLITAN AREA CLINIC 23 | Facility: CLINIC | Age: 36
Setting detail: DERMATOLOGY
End: 2021-04-12

## 2021-04-12 DIAGNOSIS — L21.8 OTHER SEBORRHEIC DERMATITIS: ICD-10-CM

## 2021-04-12 DIAGNOSIS — L82.1 OTHER SEBORRHEIC KERATOSIS: ICD-10-CM

## 2021-04-12 DIAGNOSIS — L80 VITILIGO: ICD-10-CM

## 2021-04-12 DIAGNOSIS — D22 MELANOCYTIC NEVI: ICD-10-CM

## 2021-04-12 DIAGNOSIS — Z80.8 FAMILY HISTORY OF MALIGNANT NEOPLASM OF OTHER ORGANS OR SYSTEMS: ICD-10-CM

## 2021-04-12 DIAGNOSIS — Z87.2 PERSONAL HISTORY OF DISEASES OF THE SKIN AND SUBCUTANEOUS TISSUE: ICD-10-CM

## 2021-04-12 PROBLEM — D22.72 MELANOCYTIC NEVI OF LEFT LOWER LIMB, INCLUDING HIP: Status: ACTIVE | Noted: 2021-04-12

## 2021-04-12 PROBLEM — D22.5 MELANOCYTIC NEVI OF TRUNK: Status: ACTIVE | Noted: 2021-04-12

## 2021-04-12 PROCEDURE — ? PRESCRIPTION

## 2021-04-12 PROCEDURE — ? COUNSELING

## 2021-04-12 PROCEDURE — ? TREATMENT REGIMEN

## 2021-04-12 PROCEDURE — 99214 OFFICE O/P EST MOD 30 MIN: CPT

## 2021-04-12 PROCEDURE — ? PATIENT SPECIFIC COUNSELING

## 2021-04-12 RX ORDER — PIMECROLIMUS 10 MG/G
CREAM TOPICAL
Qty: 1 | Refills: 11 | Status: ERX | COMMUNITY
Start: 2021-04-12

## 2021-04-12 RX ORDER — KETOCONAZOLE 20 MG/ML
SHAMPOO, SUSPENSION TOPICAL
Qty: 1 | Refills: 11 | Status: ERX | COMMUNITY
Start: 2021-04-12

## 2021-04-12 RX ADMIN — PIMECROLIMUS: 10 CREAM TOPICAL at 00:00

## 2021-04-12 RX ADMIN — KETOCONAZOLE: 20 SHAMPOO, SUSPENSION TOPICAL at 00:00

## 2021-04-12 ASSESSMENT — LOCATION DETAILED DESCRIPTION DERM
LOCATION DETAILED: RIGHT AXILLARY VAULT
LOCATION DETAILED: UPPER STERNUM
LOCATION DETAILED: LEFT MEDIAL DORSAL FOOT
LOCATION DETAILED: LEFT AXILLARY VAULT
LOCATION DETAILED: MID-FRONTAL SCALP
LOCATION DETAILED: LEFT RIB CAGE
LOCATION DETAILED: LEFT SUPERIOR CENTRAL MALAR CHEEK
LOCATION DETAILED: RIGHT LATERAL ABDOMEN
LOCATION DETAILED: LEFT CHIN
LOCATION DETAILED: UPPER STERNUM
LOCATION DETAILED: LEFT SUPERIOR MEDIAL UPPER BACK
LOCATION DETAILED: RIGHT MEDIAL UPPER BACK

## 2021-04-12 ASSESSMENT — LOCATION ZONE DERM
LOCATION ZONE: SCALP
LOCATION ZONE: AXILLAE
LOCATION ZONE: FEET
LOCATION ZONE: TRUNK
LOCATION ZONE: FACE
LOCATION ZONE: TRUNK

## 2021-04-12 ASSESSMENT — LOCATION SIMPLE DESCRIPTION DERM
LOCATION SIMPLE: LEFT UPPER BACK
LOCATION SIMPLE: LEFT CHEEK
LOCATION SIMPLE: LEFT FOOT
LOCATION SIMPLE: CHEST
LOCATION SIMPLE: CHIN
LOCATION SIMPLE: LEFT AXILLARY VAULT
LOCATION SIMPLE: CHEST
LOCATION SIMPLE: RIGHT AXILLARY VAULT
LOCATION SIMPLE: ANTERIOR SCALP
LOCATION SIMPLE: RIGHT UPPER BACK
LOCATION SIMPLE: ABDOMEN

## 2022-04-11 ENCOUNTER — APPOINTMENT (RX ONLY)
Dept: URBAN - METROPOLITAN AREA CLINIC 24 | Facility: CLINIC | Age: 37
Setting detail: DERMATOLOGY
End: 2022-04-11

## 2022-04-11 DIAGNOSIS — Z87.2 PERSONAL HISTORY OF DISEASES OF THE SKIN AND SUBCUTANEOUS TISSUE: ICD-10-CM

## 2022-04-11 DIAGNOSIS — Z80.8 FAMILY HISTORY OF MALIGNANT NEOPLASM OF OTHER ORGANS OR SYSTEMS: ICD-10-CM

## 2022-04-11 DIAGNOSIS — D18.0 HEMANGIOMA: ICD-10-CM

## 2022-04-11 DIAGNOSIS — D22 MELANOCYTIC NEVI: ICD-10-CM

## 2022-04-11 DIAGNOSIS — L80 VITILIGO: ICD-10-CM | Status: INADEQUATELY CONTROLLED

## 2022-04-11 PROBLEM — D22.72 MELANOCYTIC NEVI OF LEFT LOWER LIMB, INCLUDING HIP: Status: ACTIVE | Noted: 2022-04-11

## 2022-04-11 PROBLEM — D22.5 MELANOCYTIC NEVI OF TRUNK: Status: ACTIVE | Noted: 2022-04-11

## 2022-04-11 PROBLEM — D18.01 HEMANGIOMA OF SKIN AND SUBCUTANEOUS TISSUE: Status: ACTIVE | Noted: 2022-04-11

## 2022-04-11 PROCEDURE — ? PATIENT SPECIFIC COUNSELING

## 2022-04-11 PROCEDURE — ? PRESCRIPTION MEDICATION MANAGEMENT

## 2022-04-11 PROCEDURE — 99214 OFFICE O/P EST MOD 30 MIN: CPT

## 2022-04-11 PROCEDURE — ? COUNSELING

## 2022-04-11 ASSESSMENT — LOCATION DETAILED DESCRIPTION DERM
LOCATION DETAILED: LEFT MEDIAL DORSAL FOOT
LOCATION DETAILED: RIGHT MEDIAL UPPER BACK
LOCATION DETAILED: LEFT SUPERIOR MEDIAL UPPER BACK
LOCATION DETAILED: PERIUMBILICAL SKIN
LOCATION DETAILED: UPPER STERNUM
LOCATION DETAILED: UPPER STERNUM
LOCATION DETAILED: LEFT RIB CAGE
LOCATION DETAILED: RIGHT LATERAL ABDOMEN

## 2022-04-11 ASSESSMENT — LOCATION SIMPLE DESCRIPTION DERM
LOCATION SIMPLE: LEFT FOOT
LOCATION SIMPLE: CHEST
LOCATION SIMPLE: RIGHT UPPER BACK
LOCATION SIMPLE: CHEST
LOCATION SIMPLE: LEFT UPPER BACK
LOCATION SIMPLE: ABDOMEN

## 2022-04-11 ASSESSMENT — LOCATION ZONE DERM
LOCATION ZONE: TRUNK
LOCATION ZONE: FEET
LOCATION ZONE: TRUNK

## 2023-04-17 ENCOUNTER — APPOINTMENT (RX ONLY)
Dept: URBAN - METROPOLITAN AREA CLINIC 23 | Facility: CLINIC | Age: 38
Setting detail: DERMATOLOGY
End: 2023-04-17

## 2023-04-17 DIAGNOSIS — Z87.2 PERSONAL HISTORY OF DISEASES OF THE SKIN AND SUBCUTANEOUS TISSUE: ICD-10-CM

## 2023-04-17 DIAGNOSIS — Z80.8 FAMILY HISTORY OF MALIGNANT NEOPLASM OF OTHER ORGANS OR SYSTEMS: ICD-10-CM

## 2023-04-17 DIAGNOSIS — D18.0 HEMANGIOMA: ICD-10-CM

## 2023-04-17 DIAGNOSIS — L80 VITILIGO: ICD-10-CM

## 2023-04-17 DIAGNOSIS — D22 MELANOCYTIC NEVI: ICD-10-CM

## 2023-04-17 PROBLEM — D48.5 NEOPLASM OF UNCERTAIN BEHAVIOR OF SKIN: Status: ACTIVE | Noted: 2023-04-17

## 2023-04-17 PROBLEM — D18.01 HEMANGIOMA OF SKIN AND SUBCUTANEOUS TISSUE: Status: ACTIVE | Noted: 2023-04-17

## 2023-04-17 PROBLEM — D22.5 MELANOCYTIC NEVI OF TRUNK: Status: ACTIVE | Noted: 2023-04-17

## 2023-04-17 PROBLEM — D22.72 MELANOCYTIC NEVI OF LEFT LOWER LIMB, INCLUDING HIP: Status: ACTIVE | Noted: 2023-04-17

## 2023-04-17 PROCEDURE — ? COUNSELING

## 2023-04-17 PROCEDURE — ? SHAVE REMOVAL

## 2023-04-17 PROCEDURE — ? PRESCRIPTION MEDICATION MANAGEMENT

## 2023-04-17 PROCEDURE — 99213 OFFICE O/P EST LOW 20 MIN: CPT | Mod: 25

## 2023-04-17 PROCEDURE — 11300 SHAVE SKIN LESION 0.5 CM/<: CPT

## 2023-04-17 PROCEDURE — ? PATIENT SPECIFIC COUNSELING

## 2023-04-17 ASSESSMENT — LOCATION SIMPLE DESCRIPTION DERM
LOCATION SIMPLE: RIGHT FOREARM
LOCATION SIMPLE: ABDOMEN
LOCATION SIMPLE: LEFT UPPER BACK
LOCATION SIMPLE: CHEST
LOCATION SIMPLE: CHEST
LOCATION SIMPLE: LEFT FOOT
LOCATION SIMPLE: RIGHT UPPER BACK

## 2023-04-17 ASSESSMENT — LOCATION DETAILED DESCRIPTION DERM
LOCATION DETAILED: LEFT SUPERIOR MEDIAL UPPER BACK
LOCATION DETAILED: RIGHT LATERAL ABDOMEN
LOCATION DETAILED: UPPER STERNUM
LOCATION DETAILED: LEFT MEDIAL DORSAL FOOT
LOCATION DETAILED: RIGHT MEDIAL UPPER BACK
LOCATION DETAILED: LEFT RIB CAGE
LOCATION DETAILED: RIGHT DISTAL DORSAL FOREARM
LOCATION DETAILED: PERIUMBILICAL SKIN
LOCATION DETAILED: UPPER STERNUM

## 2023-04-17 ASSESSMENT — LOCATION ZONE DERM
LOCATION ZONE: TRUNK
LOCATION ZONE: TRUNK
LOCATION ZONE: ARM
LOCATION ZONE: FEET

## 2023-04-17 NOTE — PROCEDURE: SHAVE REMOVAL
Medical Necessity Information: It is in your best interest to select a reason for this procedure from the list below. All of these items fulfill various CMS LCD requirements except the new and changing color options.
Medical Necessity Clause: This procedure was medically necessary because the lesion that was treated was:
Lab: [id number]
Lab Facility: [id number]
Detail Level: Detailed
Was A Bandage Applied: Yes
Size Of Lesion In Cm (Required): 0.2
X Size Of Lesion In Cm (Optional): 0
Depth Of Shave: dermis
Biopsy Method: Dermablade
Anesthesia Type: 1% lidocaine without epinephrine
Hemostasis: Drysol
Wound Care: Petrolatum
Render Path Notes In Note?: No
Consent was obtained from the patient. The risks and benefits to therapy were discussed in detail. Specifically, the risks of infection, scarring, bleeding, prolonged wound healing, incomplete removal, allergy to anesthesia, nerve injury and recurrence were addressed. Prior to the procedure, the treatment site was clearly identified and confirmed by the patient. All components of Universal Protocol/PAUSE Rule completed.
Post-Care Instructions: I reviewed with the patient in detail post-care instructions. Patient is to keep the biopsy site dry overnight, and then apply petrolatum twice daily until healed.
Notification Instructions: Patient will be notified of pathology results. However, patient instructed to call the office if not contacted within 2 weeks.
Billing Type: Third-Party Bill

## 2023-08-21 ENCOUNTER — HOSPITAL ENCOUNTER (OUTPATIENT)
Dept: PHYSICAL THERAPY | Age: 38
Setting detail: RECURRING SERIES
Discharge: HOME | End: 2023-08-24
Payer: COMMERCIAL

## 2023-08-21 PROCEDURE — 97110 THERAPEUTIC EXERCISES: CPT

## 2023-08-21 PROCEDURE — 97161 PT EVAL LOW COMPLEX 20 MIN: CPT

## 2023-08-21 NOTE — PLAN OF CARE
Katharina Long  : 1985  Primary: Ramya Robles ([facility])  Secondary:  [address] [address] [facility] [address]  Phone: [phone]  Fax: [phone] Plan Frequency: 2x to 3x/week    Plan of Care/Certification Expiration Date: 23      PT Visit Info:  Plan Frequency: 2x to 3x/week  Plan of Care/Certification Expiration Date: 23      Visit Count:  1                OUTPATIENT PHYSICAL THERAPY:             OP NOTE TYPE: Initial Assessment 2023               Episode (L shoulder labral repair - PT) Appt Desk         Treatment Diagnosis:  Pain in Left Shoulder (M25.512)  Stiffness of Left Shoulder, Not elsewhere classified (M25.612)  Medical/Referring Diagnosis:  Shoulder instability, left [M25.312]  Referring Physician:  Kaiser George MD MD Orders:  PT Eval and Treat   Return MD Appt:  TBD  Date of Onset:  Onset Date: 23      Allergies:  Patient has no allergy information on record. Restrictions/Precautions:    Restrictions/Precautions: Surgical protocol  Required Braces or Orthoses?: Yes        Medications Last Reviewed:  2023     SUBJECTIVE   History of Injury/Illness (Reason for Referral):  Pt comes to PT following left shoulder arthroscopy (posterior labral repair, capsular shift, sursectomy, acromioplasty) on 2023 - original injury was a left shoulder posterior dislocation in 2022 due to a seizure, and a repeat dislocation while putting on lotion on 2023. Patient Stated Goal(s):  \"Regain range of motion and strength\"  Initial:    5  /10 Post Session:    No increase  /10  Past Medical History/Comorbidities:   Ms. Delilah Lozano  has no past medical history on file. Ms. Delilah Lozano  has no past surgical history on file. Social History/Living Environment:   Lives With: Spouse; Daughter;  Son  Type of Home: House  Home Layout: Two level       Prior Level of Function/Work/Activity:   Prior level of function:

## 2023-08-21 NOTE — PROGRESS NOTES
Asia Darin  : 1985  Primary: Lety Robles ([facility])  Secondary:  [address] [facility] [address]  Phone: [phone]  Fax: [phone] Plan Frequency: 2x to 3x/week  Plan of Care/Certification Expiration Date: 23      >PT Visit Info:  Plan Frequency: 2x to 3x/week  Plan of Care/Certification Expiration Date: 23      Visit Count:  1    OUTPATIENT PHYSICAL THERAPY:OP NOTE TYPE: OP Note Type: Treatment Note 2023       Episode  }Appt Desk             Treatment Diagnosis:  Pain in Left Shoulder (M25.512)  Stiffness of Left Shoulder, Not elsewhere classified (M25.612)  Medical/Referring Diagnosis:  Shoulder instability, left [M25.312]  Referring Physician:  Bib Fleming MD MD Orders:  PT Eval and Treat   Date of Onset:  Onset Date: 23     Allergies:   Patient has no allergy information on record. Restrictions/Precautions:  Restrictions/Precautions: Surgical protocol  Required Braces or Orthoses?: Yes  No data recorded   Interventions Planned (Treatment may consist of any combination of the following):    Current Treatment Recommendations: Strengthening; ROM; Manual; Home exercise program; Modalities; Therapeutic activities     >Subjective Comments:   See today's initial evaluation report. >Initial:     10>Post Session:     No increase   /10  Medications Last Reviewed:  2023  Updated Objective Findings:  Findings from initial evaluation unless otherwise noted:  23:   Observation: Pt wearing a left UE airplane sling  ROM :  Left shoulder PROM's: flexion to 90 deg, ER to 25 deg, IR to 20 deg (ER/IR in 30 deg abd)  Right shoulder AROM's: flexion = 163 deg, abduction = 167 deg, ER = 95 deg, IR (abducted 90 deg ) = 52 deg. APLEY ER/IR = T2/T6  Strength:  left shoulder testing deferred per post-surgical repair guidelines. R shoulder: 5/5 flexion, extension, ER, IR, biceps/triceps.    Remainder of shoulder evaluation

## 2023-08-23 ENCOUNTER — HOSPITAL ENCOUNTER (OUTPATIENT)
Dept: PHYSICAL THERAPY | Age: 38
Setting detail: RECURRING SERIES
Discharge: HOME | End: 2023-08-26
Payer: COMMERCIAL

## 2023-08-23 PROCEDURE — 97110 THERAPEUTIC EXERCISES: CPT

## 2023-08-23 NOTE — PROGRESS NOTES
Kerry Cancer  : 1985  Primary: Edgardo Goldstein Sc ([employer])  Secondary:  [address] [facility] [address]  Phone: [phone]  Fax: [phone] Plan Frequency: 2x to 3x/week  Plan of Care/Certification Expiration Date: 23      >PT Visit Info:  Plan Frequency: 2x to 3x/week  Plan of Care/Certification Expiration Date: 23      Visit Count:  2    OUTPATIENT PHYSICAL THERAPY:OP NOTE TYPE: OP Note Type: Treatment Note 2023       Episode  }Appt Desk             Treatment Diagnosis:  Pain in Left Shoulder (M25.512)  Stiffness of Left Shoulder, Not elsewhere classified (M25.612)  Medical/Referring Diagnosis:  Shoulder instability, left [M25.312]  Referring Physician:  Ramírez Weaver MD MD Orders:  PT Eval and Treat   Date of Onset:  Onset Date: 23     Allergies:   Patient has no allergy information on record. Restrictions/Precautions:  Restrictions/Precautions: Surgical protocol  Required Braces or Orthoses?: Yes  No data recorded   Interventions Planned (Treatment may consist of any combination of the following):    Current Treatment Recommendations: Strengthening; ROM; Manual; Home exercise program; Modalities; Therapeutic activities     >Subjective Comments:   Doing well, no shoulder pain. >Initial:     0 /10>Post Session:     No increase   /10  Medications Last Reviewed:  2023  Updated Objective Findings:  Findings from initial evaluation unless otherwise noted:  23:   Observation: Pt wearing a left UE airplane sling  ROM :  Left shoulder PROM's: flexion to 90 deg, ER to 25 deg, IR to 20 deg (ER/IR in 30 deg abd)  Right shoulder AROM's: flexion = 163 deg, abduction = 167 deg, ER = 95 deg, IR (abducted 90 deg ) = 52 deg. APLEY ER/IR = T2/T6  Strength:  left shoulder testing deferred per post-surgical repair guidelines. R shoulder: 5/5 flexion, extension, ER, IR, biceps/triceps.    Remainder of shoulder evaluation deferred per

## 2023-08-25 ENCOUNTER — HOSPITAL ENCOUNTER (OUTPATIENT)
Dept: PHYSICAL THERAPY | Age: 38
Setting detail: RECURRING SERIES
Discharge: HOME | End: 2023-08-28
Payer: COMMERCIAL

## 2023-08-25 PROCEDURE — 97110 THERAPEUTIC EXERCISES: CPT

## 2023-08-25 NOTE — PROGRESS NOTES
Armand Pod  : 1985  Primary: Eduar Robles ([facility]  Secondary:  [address] [facility] [address]  Phone: [phone]  Fax: [phone] Plan Frequency: 2x to 3x/week  Plan of Care/Certification Expiration Date: 23      >PT Visit Info:  Plan Frequency: 2x to 3x/week  Plan of Care/Certification Expiration Date: 23      Visit Count:  3    OUTPATIENT PHYSICAL THERAPY:OP NOTE TYPE: OP Note Type: Treatment Note 2023       Episode  }Appt Desk             Treatment Diagnosis:  Pain in Left Shoulder (M25.512)  Stiffness of Left Shoulder, Not elsewhere classified (M25.612)  Medical/Referring Diagnosis:  Shoulder instability, left [M25.312]  Referring Physician:  Jessica Barksdale MD MD Orders:  PT Eval and Treat   Date of Onset:  Onset Date: 23     Allergies:   Patient has no allergy information on record. Restrictions/Precautions:  Restrictions/Precautions: Surgical protocol  Required Braces or Orthoses?: Yes  No data recorded   Interventions Planned (Treatment may consist of any combination of the following):    Current Treatment Recommendations: Strengthening; ROM; Manual; Home exercise program; Modalities; Therapeutic activities     >Subjective Comments:   Mild left anterior shoulder pain during ROM today. >Initial:     1 /10>Post Session:     No increase   /10  Medications Last Reviewed:  2023  Updated Objective Findings:  Findings from initial evaluation unless otherwise noted:  23:   Observation: Pt wearing a left UE airplane sling  ROM :  Left shoulder PROM's: flexion to 90 deg, ER to 25 deg, IR to 20 deg (ER/IR in 30 deg abd)  Right shoulder AROM's: flexion = 163 deg, abduction = 167 deg, ER = 95 deg, IR (abducted 90 deg ) = 52 deg. APLEY ER/IR = T2/T6  Strength:  left shoulder testing deferred per post-surgical repair guidelines. R shoulder: 5/5 flexion, extension, ER, IR, biceps/triceps.    Remainder of shoulder

## 2023-08-28 ENCOUNTER — HOSPITAL ENCOUNTER (OUTPATIENT)
Dept: PHYSICAL THERAPY | Age: 38
Setting detail: RECURRING SERIES
Discharge: HOME | End: 2023-08-31
Payer: COMMERCIAL

## 2023-08-28 PROCEDURE — 97110 THERAPEUTIC EXERCISES: CPT

## 2023-08-28 NOTE — PROGRESS NOTES
Byron Whittington  : 1985  Primary: Britney Robles ([employer])  Secondary:  [address] [facility] [address]  Phone: [phone]  Fax: [phone] Plan Frequency: 2x to 3x/week  Plan of Care/Certification Expiration Date: 23      >PT Visit Info:  Plan Frequency: 2x to 3x/week  Plan of Care/Certification Expiration Date: 23      Visit Count:  4    OUTPATIENT PHYSICAL THERAPY:OP NOTE TYPE: OP Note Type: Treatment Note 2023       Episode  }Appt Desk             Treatment Diagnosis:  Pain in Left Shoulder (M25.512)  Stiffness of Left Shoulder, Not elsewhere classified (M25.612)  Medical/Referring Diagnosis:  Shoulder instability, left [M25.312]  Referring Physician:  Dong Smiley MD MD Orders:  PT Eval and Treat   Date of Onset:  Onset Date: 23     Allergies:   Patient has no allergy information on record. Restrictions/Precautions:  Restrictions/Precautions: Surgical protocol  Required Braces or Orthoses?: Yes  No data recorded   Interventions Planned (Treatment may consist of any combination of the following):    Current Treatment Recommendations: Strengthening; ROM; Manual; Home exercise program; Modalities; Therapeutic activities     >Subjective Comments:   No pain/soreness at present, only mild pain w/lying in bed. Still sleeping in recliner  >Initial: 0 /10>Post Session:  No increase/10  Medications Last Reviewed:  2023  Updated Objective Findings:  Findings from initial evaluation unless otherwise noted:  23:   Observation: Pt wearing a left UE airplane sling  ROM :  Left shoulder PROM's: flexion to 90 deg, ER to 25 deg, IR to 20 deg (ER/IR in 30 deg abd)  Right shoulder AROM's: flexion = 163 deg, abduction = 167 deg, ER = 95 deg, IR (abducted 90 deg ) = 52 deg. APLEY ER/IR = T2/T6  Strength:  left shoulder testing deferred per post-surgical repair guidelines. R shoulder: 5/5 flexion, extension, ER, IR, biceps/triceps.

## 2023-08-30 ENCOUNTER — HOSPITAL ENCOUNTER (OUTPATIENT)
Dept: PHYSICAL THERAPY | Age: 38
Setting detail: RECURRING SERIES
Discharge: HOME | End: 2023-09-02
Payer: COMMERCIAL

## 2023-08-30 PROCEDURE — 97110 THERAPEUTIC EXERCISES: CPT

## 2023-08-30 NOTE — PROGRESS NOTES
Casper Velezkathryn  : 1985  Primary: Ashley Robles ([employer] [facility])  Secondary:  [address] [facility] [address]  Phone: [phone]  Fax: [phone] Plan Frequency: 2x to 3x/week  Plan of Care/Certification Expiration Date: 23      >PT Visit Info:  Plan Frequency: 2x to 3x/week  Plan of Care/Certification Expiration Date: 23      Visit Count:  5    OUTPATIENT PHYSICAL THERAPY:OP NOTE TYPE: OP Note Type: Treatment Note 2023       Episode  }Appt Desk             Treatment Diagnosis:  Pain in Left Shoulder (M25.512)  Stiffness of Left Shoulder, Not elsewhere classified (M25.612)  Medical/Referring Diagnosis:  Shoulder instability, left [M25.312]  Referring Physician:  Anisa Centeno MD MD Orders:  PT Eval and Treat   Date of Onset:  Onset Date: 23     Allergies:   Patient has no allergy information on record. Restrictions/Precautions:  Restrictions/Precautions: Surgical protocol  Required Braces or Orthoses?: Yes  No data recorded   Interventions Planned (Treatment may consist of any combination of the following):    Current Treatment Recommendations: Strengthening; ROM; Manual; Home exercise program; Modalities; Therapeutic activities     >Subjective Comments:   Saw Dr. Ariel Hernandez yesterday , states that he encouraged her to continue to be careful   >Initial: 0 /10>Post Session:  No increase/10  Medications Last Reviewed:  2023  Updated Objective Findings:  Findings from initial evaluation unless otherwise noted:  23:   Observation: Pt wearing a left UE airplane sling  ROM :  Left shoulder PROM's: flexion to 90 deg, ER to 25 deg, IR to 20 deg (ER/IR in 30 deg abd)  Right shoulder AROM's: flexion = 163 deg, abduction = 167 deg, ER = 95 deg, IR (abducted 90 deg ) = 52 deg. APLEY ER/IR = T2/T6  Strength:  left shoulder testing deferred per post-surgical repair guidelines. R shoulder: 5/5 flexion, extension, ER, IR, biceps/triceps.

## 2023-09-01 ENCOUNTER — HOSPITAL ENCOUNTER (OUTPATIENT)
Dept: PHYSICAL THERAPY | Age: 38
Setting detail: RECURRING SERIES
Discharge: HOME | End: 2023-09-04
Payer: COMMERCIAL

## 2023-09-01 PROCEDURE — 97110 THERAPEUTIC EXERCISES: CPT

## 2023-09-01 NOTE — PROGRESS NOTES
weekly - 30 reps  - Supine Shoulder External Rotation with Dowel at 20 Degrees of Abduction (Mirrored)  - 2 x daily - 7 x weekly - 30 reps  - Seated Elbow Flexion and Extension AROM  - 2 x daily - 7 x weekly - 20 reps    Treatment/Session Summary:    >Treatment Assessment:  Progress continues to be stable, should be ready to wean airplane cushion from sling next week  Communication/Consultation:  None today  Equipment provided today:  None  Recommendations/Intent for next treatment session: Next visit will focus on Post-surgical rehab (week 0-3: ER at 30 to 45 deg abd to 30 deg, IR to 20 deg, flexion to 90 deg.     >Total Treatment Billable Duration:  30 minutes  Time In: 0930  Time Out: 1000    Tevinjuni Feldman, PT       Charge Capture  }Post Session Pain  PT Visit Info  [email or web address] Portal  MD Guidelines  Scanned Media  Benefits  Whoishart    Future Appointments   Date Time Provider [address]   9/5/2023  8:45 AM Shaggy Marley PTA [facility]   9/7/2023  2:00 PM Roc Champion, PT [facility]   9/13/2023  9:30 [facility]   9/15/2023  2:45 PM Tevinbrian Cazaresp, PT [facility]   9/18/2023  8:00 AM Tevin Cazaresp, PT [facility]   9/20/2023  8:45 AM Shaggy Marley PTA [facility]   9/25/2023  8:00 AM Tevin Rump, PT [facility]   9/27/2023  8:00 AM Shaggy Marley PTA [facility]

## 2023-09-05 ENCOUNTER — HOSPITAL ENCOUNTER (OUTPATIENT)
Dept: PHYSICAL THERAPY | Age: 38
Setting detail: RECURRING SERIES
Discharge: HOME | End: 2023-09-08
Payer: COMMERCIAL

## 2023-09-05 PROCEDURE — 97110 THERAPEUTIC EXERCISES: CPT

## 2023-09-05 NOTE — PROGRESS NOTES
Teodoro Nugent  : 1985  Primary: Jessica Skinner Sc ([employer])  Secondary:  [address] [facility] [address]  Phone: [phone]  Fax: [phone] Plan Frequency: 2x to 3x/week  Plan of Care/Certification Expiration Date: 23      >PT Visit Info:  Plan Frequency: 2x to 3x/week  Plan of Care/Certification Expiration Date: 23      Visit Count:  7    OUTPATIENT PHYSICAL THERAPY:OP NOTE TYPE: OP Note Type: Treatment Note 2023       Episode  }Appt Desk             Treatment Diagnosis:  Pain in Left Shoulder (M25.512)  Stiffness of Left Shoulder, Not elsewhere classified (M25.612)  Medical/Referring Diagnosis:  Shoulder instability, left [M25.312]  Referring Physician:  Sophia Connell MD MD Orders:  PT Eval and Treat   Date of Onset:  Onset Date: 23     Allergies:   Patient has no allergy information on record. Restrictions/Precautions:  Restrictions/Precautions: Surgical protocol  Required Braces or Orthoses?: Yes  No data recorded   Interventions Planned (Treatment may consist of any combination of the following):    Current Treatment Recommendations: Strengthening; ROM; Manual; Home exercise program; Modalities; Therapeutic activities     >Subjective Comments:   Pt was able to sleep in the bed this weekend. She has removed the abduction pillow. >Initial: 0 /10>Post Session:  No increase/10  Medications Last Reviewed:  2023  Updated Objective Findings:  Findings from initial evaluation unless otherwise noted:  23:   Observation: Pt wearing a left UE airplane sling  ROM :  Left shoulder PROM's: flexion to 90 deg, ER to 25 deg, IR to 20 deg (ER/IR in 30 deg abd)  Right shoulder AROM's: flexion = 163 deg, abduction = 167 deg, ER = 95 deg, IR (abducted 90 deg ) = 52 deg. APLEY ER/IR = T2/T6  Strength:  left shoulder testing deferred per post-surgical repair guidelines. R shoulder: 5/5 flexion, extension, ER, IR, biceps/triceps.

## 2023-09-07 ENCOUNTER — HOSPITAL ENCOUNTER (OUTPATIENT)
Dept: PHYSICAL THERAPY | Age: 38
Setting detail: RECURRING SERIES
Discharge: HOME | End: 2023-09-10
Payer: COMMERCIAL

## 2023-09-07 PROCEDURE — 97110 THERAPEUTIC EXERCISES: CPT

## 2023-09-07 NOTE — PROGRESS NOTES
Katharina Long  : 1985  Primary: Ramya Robles (Edis [facility])  Secondary:  [address] [facility] [address]  Phone: [phone]  Fax: [phone] Plan Frequency: 2x to 3x/week  Plan of Care/Certification Expiration Date: 23      >PT Visit Info:  Plan Frequency: 2x to 3x/week  Plan of Care/Certification Expiration Date: 23      Visit Count:  8    OUTPATIENT PHYSICAL THERAPY:OP NOTE TYPE: OP Note Type: Treatment Note and Progress Report 2023       Episode  }Appt Desk             Treatment Diagnosis:  Pain in Left Shoulder (M25.512)  Stiffness of Left Shoulder, Not elsewhere classified (M25.612)  Medical/Referring Diagnosis:  Shoulder instability, left [M25.312]  Referring Physician:  Kaiser George MD MD Orders:  PT Eval and Treat   Date of Onset:  Onset Date: 23     Allergies:   Patient has no allergy information on record. Restrictions/Precautions:  Restrictions/Precautions: Surgical protocol  Required Braces or Orthoses?: Yes  No data recorded   Interventions Planned (Treatment may consist of any combination of the following):    Current Treatment Recommendations: Strengthening; ROM; Manual; Home exercise program; Modalities; Therapeutic activities     >Subjective Comments:   Pt has successfully weaned her self from abduction pillow on sling. She's back to sleeping in bed, just has to be careful w/ her sleep posture so that she doesn't wake herself due to shoulder pain. >Initial: 0 /10>Post Session:  No increase/10  Medications Last Reviewed:  2023  Updated Objective Findings:  Findings from initial evaluation unless otherwise noted:  23:   Observation: Pt wearing a left UE airplane sling  ROM :  Left shoulder PROM's: flexion to 90 deg, ER to 25 deg, IR to 20 deg (ER/IR in 30 deg abd)  Right shoulder AROM's: flexion = 163 deg, abduction = 167 deg, ER = 95 deg, IR (abducted 90 deg ) = 52 deg.  APLEY ER/IR = T2/T6  Strength:

## 2023-09-13 ENCOUNTER — HOSPITAL ENCOUNTER (OUTPATIENT)
Dept: PHYSICAL THERAPY | Age: 38
Setting detail: RECURRING SERIES
Discharge: HOME | End: 2023-09-16
Payer: COMMERCIAL

## 2023-09-13 PROCEDURE — 97110 THERAPEUTIC EXERCISES: CPT

## 2023-09-13 NOTE — PROGRESS NOTES
Bushra Hodgkin  : 1985  Primary: Margaux Hsu Sc ([employer]dis [employer])  Secondary:  [address] [facility] [address]  Phone: [phone]  Fax: [phone] Plan Frequency: 2x to 3x/week  Plan of Care/Certification Expiration Date: 23      >PT Visit Info:  Plan Frequency: 2x to 3x/week  Plan of Care/Certification Expiration Date: 23      Visit Count:  9    OUTPATIENT PHYSICAL THERAPY:OP NOTE TYPE: OP Note Type: Treatment Note 2023       Episode  }Appt Desk             Treatment Diagnosis:  Pain in Left Shoulder (M25.512)  Stiffness of Left Shoulder, Not elsewhere classified (M25.612)  Medical/Referring Diagnosis:  Shoulder instability, left [M25.312]  Referring Physician:  Rickey Taylor MD MD Orders:  PT Eval and Treat   Date of Onset:  Onset Date: 23     Allergies:   Patient has no allergy information on record. Restrictions/Precautions:  Restrictions/Precautions: Surgical protocol  Required Braces or Orthoses?: Yes  No data recorded   Interventions Planned (Treatment may consist of any combination of the following):    Current Treatment Recommendations: Strengthening; ROM; Manual; Home exercise program; Modalities; Therapeutic activities     >Subjective Comments:   Biceps feels tight. Pt notes that Dr. Malia Duvall said she could discontinue sling use at this point. >Initial: 0 /10>Post Session:  No increase/10  Medications Last Reviewed:  2023  Updated Objective Findings:  Findings from initial evaluation unless otherwise noted:  23:   Observation: Pt wearing a left UE airplane sling  ROM :  Left shoulder PROM's: flexion to 90 deg, ER to 25 deg, IR to 20 deg (ER/IR in 30 deg abd)  Right shoulder AROM's: flexion = 163 deg, abduction = 167 deg, ER = 95 deg, IR (abducted 90 deg ) = 52 deg. APLEY ER/IR = T2/T6  Strength:  left shoulder testing deferred per post-surgical repair guidelines.  R shoulder: 5/5 flexion, extension, ER, IR,

## 2023-09-15 ENCOUNTER — HOSPITAL ENCOUNTER (OUTPATIENT)
Dept: PHYSICAL THERAPY | Age: 38
Setting detail: RECURRING SERIES
Discharge: HOME | End: 2023-09-18
Payer: COMMERCIAL

## 2023-09-15 PROCEDURE — 97110 THERAPEUTIC EXERCISES: CPT

## 2023-09-15 NOTE — PROGRESS NOTES
Kerry Cancer  : 1985  Primary: Edgardo Goldstein Sc ([employer])  Secondary:  [address] [facility] [address]  Phone: [phone]  Fax: [phone] Plan Frequency: 2x to 3x/week  Plan of Care/Certification Expiration Date: 23      >PT Visit Info:  Plan Frequency: 2x to 3x/week  Plan of Care/Certification Expiration Date: 23      Visit Count:  10    OUTPATIENT PHYSICAL THERAPY:OP NOTE TYPE: OP Note Type: Treatment Note 9/15/2023       Episode  }Appt Desk             Treatment Diagnosis:  Pain in Left Shoulder (M25.512)  Stiffness of Left Shoulder, Not elsewhere classified (M25.612)  Medical/Referring Diagnosis:  Shoulder instability, left [M25.312]  Referring Physician:  Ramírez Weaver MD MD Orders:  PT Eval and Treat   Date of Onset:  Onset Date: 23     Allergies:   Patient has no allergy information on record. Restrictions/Precautions:  Restrictions/Precautions: Surgical protocol  Required Braces or Orthoses?: Yes  No data recorded   Interventions Planned (Treatment may consist of any combination of the following):    Current Treatment Recommendations: Strengthening; ROM; Manual; Home exercise program; Modalities; Therapeutic activities     >Subjective Comments:   Shoulder feels better since coming out of sling earlier this week. Notes pain during flexion end-range stretching. >Initial: 0 /10>Post Session:  No increase/10  Medications Last Reviewed:  9/15/2023  Updated Objective Findings:  Findings from initial evaluation unless otherwise noted:  23:   Observation: Pt wearing a left UE airplane sling  ROM :  Left shoulder PROM's: flexion to 90 deg, ER to 25 deg, IR to 20 deg (ER/IR in 30 deg abd)  Right shoulder AROM's: flexion = 163 deg, abduction = 167 deg, ER = 95 deg, IR (abducted 90 deg ) = 52 deg. APLEY ER/IR = T2/T6  Strength:  left shoulder testing deferred per post-surgical repair guidelines.  R shoulder: 5/5 flexion,

## 2023-09-18 ENCOUNTER — HOSPITAL ENCOUNTER (OUTPATIENT)
Dept: PHYSICAL THERAPY | Age: 38
Setting detail: RECURRING SERIES
Discharge: HOME | End: 2023-09-21
Payer: COMMERCIAL

## 2023-09-18 PROCEDURE — 97110 THERAPEUTIC EXERCISES: CPT

## 2023-09-18 NOTE — PROGRESS NOTES
Access Code: [email or web address]  URL: [email or web address]  Date: 08/21/2023  Prepared by: Marval Balm    Exercises  - Seated Shoulder Shrug Circles AROM Backward  - 1 x daily - 7 x weekly - 30 reps  - Seated Shoulder External Rotation AAROM with Dowel  - 2 x daily - 7 x weekly - 30 reps  - Supine Shoulder External Rotation with Dowel at 20 Degrees of Abduction (Mirrored)  - 2 x daily - 7 x weekly - 30 reps  - Seated Elbow Flexion and Extension AROM  - 2 x daily - 7 x weekly - 20 reps  Access Code: [email or web address]  URL: [email or web address]  Date: 09/13/2023  Prepared by: Marval Balm    Exercises  - Isometric Shoulder Flexion at Wall  - 1 x daily - 7 x weekly - 2 sets - 10 reps - 5 hold  - Isometric Shoulder Abduction at Wall  - 1 x daily - 7 x weekly - 2 sets - 10 reps - 5 hold  - Standing Isometric Shoulder Internal Rotation at Doorway  - 1 x daily - 7 x weekly - 2 sets - 10 reps - 5 hold  - Isometric Shoulder External Rotation at Wall  - 1 x daily - 7 x weekly - 2 sets - 10 reps - 5 hold    Treatment/Session Summary:    >Treatment Assessment:  ER continues to improve, ~ 75 deg in abduction. Scaption more comfortable at end range than flexion.    Communication/Consultation:  None today  Equipment provided today:  None  Recommendations/Intent for next treatment session: Next visit will focus on Post-surgical rehab (week 4- gradually increase ER and flexion to full, abd to 90 deg, IR to 35 deg, isometrics (limit IR isometrics)   >Total Treatment Billable Duration:  38 minutes  Time In: 0800  Time Out: 0839    Kvng Greenfield, PT       Charge Capture  }Post Session Pain  PT Visit Info  [employer] Portal  MD Guidelines  Scanned Media  Benefits  MyChart    Future Appointments   Date Time Provider [address]   9/20/2023  8:45 AM Juan Chang PTA [facility]   9/25/2023  8:00 AM Kvng Greenfield, [facility]   9/27/2023  8:00 AM Juan Chang PTA [facility]

## 2023-09-20 ENCOUNTER — HOSPITAL ENCOUNTER (OUTPATIENT)
Dept: PHYSICAL THERAPY | Age: 38
Setting detail: RECURRING SERIES
Discharge: HOME | End: 2023-09-23
Payer: COMMERCIAL

## 2023-09-20 PROCEDURE — 97110 THERAPEUTIC EXERCISES: CPT

## 2023-09-20 NOTE — PROGRESS NOTES
Per verbal order Dr. Phyllis Crowe.  Pt was last seen 5/11/2019 Theola Levels  : 1985  Primary: Rebeca Muse Sc ([employer]dis [employer])  Secondary:  [address] [facility] [address]  Phone: [phone]  Fax: [phone] Plan Frequency: 2x to 3x/week  Plan of Care/Certification Expiration Date: 23      >PT Visit Info:  Plan Frequency: 2x to 3x/week  Plan of Care/Certification Expiration Date: 23      Visit Count:  12    OUTPATIENT PHYSICAL THERAPY:OP NOTE TYPE: OP Note Type: Treatment Note 2023       Episode  }Appt Desk             Treatment Diagnosis:  Pain in Left Shoulder (M25.512)  Stiffness of Left Shoulder, Not elsewhere classified (M25.612)  Medical/Referring Diagnosis:  Shoulder instability, left [M25.312]  Referring Physician:  Yulisa Phelps MD MD Orders:  PT Eval and Treat   Date of Onset:  Onset Date: 23     Allergies:   Patient has no allergy information on record. Restrictions/Precautions:  Restrictions/Precautions: Surgical protocol  Required Braces or Orthoses?: Yes  No data recorded   Interventions Planned (Treatment may consist of any combination of the following):    Current Treatment Recommendations: Strengthening; ROM; Manual; Home exercise program; Modalities; Therapeutic activities     >Subjective Comments:   Pt reports no pain prior to treatment but has intermittent biceps soreness. >Initial: 0 /10>Post Session:  No increase/10  Medications Last Reviewed:  2023  Updated Objective Findings:  Findings from initial evaluation unless otherwise noted:  23:   Observation: Pt wearing a left UE airplane sling  ROM :  Left shoulder PROM's: flexion to 90 deg, ER to 25 deg, IR to 20 deg (ER/IR in 30 deg abd)  Right shoulder AROM's: flexion = 163 deg, abduction = 167 deg, ER = 95 deg, IR (abducted 90 deg ) = 52 deg. APLEY ER/IR = T2/T6  Strength:  left shoulder testing deferred per post-surgical repair guidelines. R shoulder: 5/5 flexion, extension, ER, IR, biceps/triceps.    Remainder

## 2023-09-25 ENCOUNTER — HOSPITAL ENCOUNTER (OUTPATIENT)
Dept: PHYSICAL THERAPY | Age: 38
Setting detail: RECURRING SERIES
Discharge: HOME | End: 2023-09-28
Payer: COMMERCIAL

## 2023-09-25 PROCEDURE — 97110 THERAPEUTIC EXERCISES: CPT

## 2023-09-25 NOTE — PROGRESS NOTES
Emma Jobs  : 1985  Primary: Marianna Robles ([facility])  Secondary:  [address] [facility] [address]  Phone: [phone]  Fax: [phone] Plan Frequency: 2x to 3x/week  Plan of Care/Certification Expiration Date: 23      >PT Visit Info:  Plan Frequency: 2x to 3x/week  Plan of Care/Certification Expiration Date: 23      Visit Count:  13    OUTPATIENT PHYSICAL THERAPY:OP NOTE TYPE: OP Note Type: Treatment Note 2023       Episode  }Appt Desk             Treatment Diagnosis:  Pain in Left Shoulder (M25.512)  Stiffness of Left Shoulder, Not elsewhere classified (M25.612)  Medical/Referring Diagnosis:  Shoulder instability, left [M25.312]  Referring Physician:  Zina Rice MD MD Orders:  PT Eval and Treat   Date of Onset:  Onset Date: 23     Allergies:   Patient has no allergy information on record. Restrictions/Precautions:  Restrictions/Precautions: Surgical protocol  Required Braces or Orthoses?: Yes  No data recorded   Interventions Planned (Treatment may consist of any combination of the following):    Current Treatment Recommendations: Strengthening; ROM; Manual; Home exercise program; Modalities; Therapeutic activities     >Subjective Comments:  Pt notices lack of internal rotation, can't stand and place left hand on hip yet. >Initial: 0 /10>Post Session:  No increase/10  Medications Last Reviewed:  2023  Updated Objective Findings:  Findings from initial evaluation unless otherwise noted:  23:   Observation: Pt wearing a left UE airplane sling  ROM :  Left shoulder PROM's: flexion to 90 deg, ER to 25 deg, IR to 20 deg (ER/IR in 30 deg abd)  Right shoulder AROM's: flexion = 163 deg, abduction = 167 deg, ER = 95 deg, IR (abducted 90 deg ) = 52 deg. APLEY ER/IR = T2/T6  Strength:  left shoulder testing deferred per post-surgical repair guidelines. R shoulder: 5/5 flexion, extension, ER, IR, biceps/triceps.

## 2023-09-27 ENCOUNTER — HOSPITAL ENCOUNTER (OUTPATIENT)
Dept: PHYSICAL THERAPY | Age: 38
Setting detail: RECURRING SERIES
Discharge: HOME | End: 2023-09-30
Payer: COMMERCIAL

## 2023-09-27 PROCEDURE — 97110 THERAPEUTIC EXERCISES: CPT

## 2023-09-27 NOTE — PROGRESS NOTES
: 30 each flex/ext, abd/add, cw,ccw Nereida isometrics: 15 x 5\" : flexion, abd, ER, extn  Sidelying L nereida horiz abd and abd: 30 each AROM Side lying abduction and horizontal abduction with scapula retraction 3x10 ea      Retro shoulder shrug-circles x 30  Biceps aarom and AROM x 4 min Elbow flex/ext AROM x 30 each Pendulums : 30 each flex/ext, abd/add, cw,ccw  SL nereida flexion wall slide 10 x 10 \" stretch w/opp hand stabilizing shoulder Wall slides into shoulder flexion with opposite hand stabilizing shoulder, 10 sec hold x10       Wall isometrics at shoulder neutral: 10 x 5\" each flexion, abd, ER, IR    L nereida behind back stretch w/ towel 10 x10\"  L arm behind back stretch 10 sec hold x10       HEP revision - see notes below. Isometrics:L shoulder flexion, abduction: 10 x 10\" each                                        Proprioceptive Activities:                                                Manual Therapy:                                    Functional Activities:                                                            Access Code: [email or web address]  URL: [email or web address]  Date: 09/25/2023  Prepared by: Navya Brooks    Exercises  - Shoulder Flexion Wall Slide with Towel (Mirrored)  - 1 x daily - 7 x weekly - 10 reps - 10 second hold  - Sleeper Stretch  - 1 x daily - 7 x weekly - 10 reps - 10 second hold  - Sidelying Shoulder External Rotation  - 1 x daily - 7 x weekly - 30 reps  - Standing Shoulder Internal Rotation Stretch Behind Back (Mirrored)  - 1 x daily - 7 x weekly - 10 reps - 10 hold    Treatment/Session Summary:    >Treatment Assessment:  Pt did not report increased pain with ROM or active motions. She reported tightness with internal rotation. Communication/Consultation:  None today  Equipment provided today:  None  Recommendations/Intent for next treatment session: Next visit will focus on Post-surgical rehab (AROM, isometrics, regain all ROM's (slower progression with IR).    >Total

## 2023-10-02 ENCOUNTER — HOSPITAL ENCOUNTER (OUTPATIENT)
Dept: PHYSICAL THERAPY | Age: 38
Setting detail: RECURRING SERIES
Discharge: HOME | End: 2023-10-05
Payer: COMMERCIAL

## 2023-10-02 PROCEDURE — 97110 THERAPEUTIC EXERCISES: CPT

## 2023-10-02 NOTE — PROGRESS NOTES
Improve pain free right shoulder ROM's to full - all planes  Improve overhead reach lifting capacity to at least 8#. Date: 9/20/23 (visit 12) 9/25/23 (visit 13) 9/27/23 (visit 14) 10/2/23 (visit 15)    Modalities:                            Therapeutic Exercise: 40 min 40 min 40 min 40  min    L shoulder PROM- flexion, and ER to available ROM and IR to 35 deg. L joseph PROM's flexion, ER, IR: available ROM's.  L shoulder ROM INTO FLEXION, ER AND IR UBE 5 min, #2 resist    Elbow flexion/ext 3x10 L joseph finger-laced fleixon AAROM x10 x 10 \"  Supine shoulder flexion fingers laced 2x10 L joseph end range stretching ER, IR, horiz add, fleixon    Wand AAROM in supine-  3x10 ea ER, flexion, scaption, and press up L joseph sleeper stretch 10 x 10\" L shoulder sleeper stretch 10 sec hold x10 Wall slide flexion stretch 10x 10 \"     Sub max isometrics into flexion, abd, and ext. 10 sec hold x10 Sidelying L joseph ER x 30  Side lying L shoulder ER 3x10 Reclined active flexion x 30      Sidelying L joseph horiz abd and abd: 30 each AROM Side lying abduction and horizontal abduction with scapula retraction 3x10 ea Sidelying : ER x 30 , abduction x30, horiz abd x 30      SL joseph flexion wall slide 10 x 10 \" stretch w/opp hand stabilizing shoulder Wall slides into shoulder flexion with opposite hand stabilizing shoulder, 10 sec hold x10 Joseph IR stretch w strap x 2 min     L joseph behind back stretch w/ towel 10 x10\"  L arm behind back stretch 10 sec hold x10      Isometrics:L shoulder flexion, abduction: 10 x 10\" each                          Proprioceptive Activities:                            Manual Therapy:                     Functional Activities:                                   Access Code: [email or web address]  URL: [email or web address]  Date: 09/25/2023  Prepared by: Kenroy Diaz    Exercises  - Shoulder Flexion Wall Slide with Towel (Mirrored)  - 1 x daily - 7 x weekly - 10 reps - 10 second hold  - Sleeper Stretch  - 1 x daily - 7

## 2023-10-04 ENCOUNTER — HOSPITAL ENCOUNTER (OUTPATIENT)
Dept: PHYSICAL THERAPY | Age: 38
Setting detail: RECURRING SERIES
Discharge: HOME | End: 2023-10-07
Payer: COMMERCIAL

## 2023-10-04 PROCEDURE — 97110 THERAPEUTIC EXERCISES: CPT

## 2023-10-04 NOTE — PROGRESS NOTES
Gabrielle Stover  : 1985  Primary: Betina Aguiar Sc ([employer])  Secondary:  [address] [facility] [address]  Phone: [phone]  Fax: [phone] Plan Frequency: 2x to 3x/week  Plan of Care/Certification Expiration Date: 23      >PT Visit Info:  Plan Frequency: 2x to 3x/week  Plan of Care/Certification Expiration Date: 23      Visit Count:  16    OUTPATIENT PHYSICAL THERAPY:OP NOTE TYPE: Treatment Note and Progress Report 10/4/2023       Episode  }Appt Desk             Treatment Diagnosis:  Pain in Left Shoulder (M25.512)  Stiffness of Left Shoulder, Not elsewhere classified (M25.612)  Medical/Referring Diagnosis:  Shoulder instability, left [M25.312]  Referring Physician:  Pricilla Naylor MD MD Orders:  PT Eval and Treat   Date of Onset:  Onset Date: 23     Allergies:   Patient has no allergy information on record. Restrictions/Precautions:  Restrictions/Precautions: Surgical protocol  Required Braces or Orthoses?: Yes  No data recorded   Interventions Planned (Treatment may consist of any combination of the following):    Current Treatment Recommendations: Strengthening; ROM; Manual; Home exercise program; Modalities; Therapeutic activities     >Subjective Comments:  Pain noted during shoulder flexion stretch. Noticing small improvements in reach behind back ROM. >Initial: 0 /10>Post Session:  No increase/10  Medications Last Reviewed:  10/4/2023  Updated Objective Findings:     23: L joseph PROM flexion = 123 deg , ER = 40 deg at 45 deg abduction ,IR to 30 deg. 10/4/23: L joseph AROM's: flexion = 137 deg , ER = ,105 IR = 45, Apley ER/IR = C6/sacral PROM's ER = 68 deg, IR = 37 deg, flexion = 140 deg before stretch, 152 deg after)   L joseph strengths:  flexion = 9kg      Abduction =       ER = 5.6 (R = 6.4      IR = 6.5 (R = 9.9kg)   QUickDASH = 20/55  Treatment   TREATMENT:   THERAPEUTIC ACTIVITY: ( see below for minutes):  Therapeutic

## 2023-10-09 ENCOUNTER — HOSPITAL ENCOUNTER (OUTPATIENT)
Dept: PHYSICAL THERAPY | Age: 38
Setting detail: RECURRING SERIES
Discharge: HOME | End: 2023-10-12
Payer: COMMERCIAL

## 2023-10-09 PROCEDURE — 97110 THERAPEUTIC EXERCISES: CPT

## 2023-10-09 NOTE — PROGRESS NOTES
deg. L joseph PROM's flexion, ER, IR: available ROM's.  L shoulder ROM INTO FLEXION, ER AND IR UBE 5 min, #2 resist UBE: 6 min, #2 resist (3 min each forw/bkwd)  UBE: #2 resist, 7 min total    Elbow flexion/ext 3x10 L joseph finger-laced siminixon AAROM x10 x 10 \"  Supine shoulder flexion fingers laced 2x10 L joseph end range stretching ER, IR, horiz add, fleixon L joseph end range stretching. - sustained flexion, sustained IR (10 min)  L joseph flexion wall slides 10 x 10\"     Wand AAROM in supine-  3x10 ea ER, flexion, scaption, and press up L joseph sleeper stretch 10 x 10\" L shoulder sleeper stretch 10 sec hold x10 Wall slide flexion stretch 10x 10 \"  L sleeper stretch : IR x 3 min, ER x 1 min L joseph PROM's.  ER, IR. Sub max isometrics into flexion, abd, and ext.  10 sec hold x10 Sidelying L joseph ER x 30  Side lying L shoulder ER 3x10 Reclined active flexion x 30  Supine active flexion x 30  Reclined joseph mid range circumductions 15 c/wccw (1 set no wt , 2 sets w/ 2# med ball     Sidelying L joseph horiz abd and abd: 30 each AROM Side lying abduction and horizontal abduction with scapula retraction 3x10 ea Sidelying : ER x 30 , abduction x30, horiz abd x 30  Sidelying : ER x 30 , abduction x30, horiz abd x 30  Sleeper stretch: L 3 x 30\"     SL joseph flexion wall slide 10 x 10 \" stretch w/opp hand stabilizing shoulder Wall slides into shoulder flexion with opposite hand stabilizing shoulder, 10 sec hold x10 Joseph IR stretch w strap x 2 min  Prone shoulder extension 2 x 15 @ 2#, Prone joseph horiz abd 1# 2 x 15     L joseph behind back stretch w/ towel 10 x10\"  L arm behind back stretch 10 sec hold x10   L joseph IR doubled red tubing 2 x 15     Isometrics:L shoulder flexion, abduction: 10 x 10\" each    L Joseph ER red tubing 2 x 15         L joseph behind back stretch w/ strap x 3 min                     Proprioceptive Activities:                                    Manual Therapy:     3 min         GH long axis traction, inferior glide (Gr. 4)

## 2023-10-11 ENCOUNTER — HOSPITAL ENCOUNTER (OUTPATIENT)
Dept: PHYSICAL THERAPY | Age: 38
Setting detail: RECURRING SERIES
Discharge: HOME | End: 2023-10-14
Payer: COMMERCIAL

## 2023-10-11 PROCEDURE — 97110 THERAPEUTIC EXERCISES: CPT

## 2023-10-11 NOTE — PROGRESS NOTES
hold x10   L joseph IR doubled red tubing 2 x 15 2# medball on wall joseph circumductions in mid-range flexion: 2 x 10 each cw/ccw. Isometrics:L shoulder flexion, abduction: 10 x 10\" each    L Joseph ER red tubing 2 x 15          L joseph behind back stretch w/ strap x 3 min                        Proprioceptive Activities:                                        Manual Therapy:     3 min          GH long axis traction, inferior glide (Gr. 4)                Functional Activities:                                                  Access Code: [email or web address]  URL: [email or web address]  Date: 09/25/2023  Prepared by: Sanjiv Hartman    Exercises  - Shoulder Flexion Wall Slide with Towel (Mirrored)  - 1 x daily - 7 x weekly - 10 reps - 10 second hold  - Sleeper Stretch  - 1 x daily - 7 x weekly - 10 reps - 10 second hold  - Sidelying Shoulder External Rotation  - 1 x daily - 7 x weekly - 30 reps  - Standing Shoulder Internal Rotation Stretch Behind Back (Mirrored)  - 1 x daily - 7 x weekly - 10 reps - 10 hold    Treatment/Session Summary:    >Treatment Assessment:  Mid range flexion endurance needs further work, pt fatigues quickly with mid range sustained flexion. IR and ER ROM's improving. Communication/Consultation:  None today  Equipment provided today:  None  Recommendations/Intent for next treatment session: Next visit will focus on Post-surgical rehab (AROM, regain all ROM's, strength).    >Total Treatment Billable Duration:  42 minutes  Time In: 0845  Time Out: 0930    Marylene Jury, PT       Charge Capture  }Post Session Pain  PT Visit Info  [employer] Portal  MD Guidelines  Scanned Media  Benefits  MyChart    Future Appointments   Date Time Provider [address]   10/25/2023  8:45 AM Marylene Jury, PT [facility]   10/27/2023  8:45 AM Wanda GIVENS, PTA [facility]

## 2023-10-16 ENCOUNTER — APPOINTMENT (OUTPATIENT)
Dept: PHYSICAL THERAPY | Age: 38
End: 2023-10-16
Payer: COMMERCIAL

## 2023-10-19 ENCOUNTER — APPOINTMENT (OUTPATIENT)
Dept: PHYSICAL THERAPY | Age: 38
End: 2023-10-19
Payer: COMMERCIAL

## 2023-10-25 ENCOUNTER — HOSPITAL ENCOUNTER (OUTPATIENT)
Dept: PHYSICAL THERAPY | Age: 38
Setting detail: RECURRING SERIES
Discharge: HOME | End: 2023-10-28
Payer: COMMERCIAL

## 2023-10-25 PROCEDURE — 97110 THERAPEUTIC EXERCISES: CPT

## 2023-10-25 NOTE — PROGRESS NOTES
slides into shoulder flexion with opposite hand stabilizing shoulder, 10 sec hold x10 Nereida IR stretch w strap x 2 min  Prone shoulder extension 2 x 15 @ 2#, Prone nereida horiz abd 1# 2 x 15 Standing tubing ER (abducted 60 deg) 3 x 10 (doubled red)       L nereida behind back stretch w/ towel 10 x10\"  L arm behind back stretch 10 sec hold x10   L nereida IR doubled red tubing 2 x 15 2# medball on wall nereida circumductions in mid-range flexion: 2 x 10 each cw/ccw. Isometrics:L shoulder flexion, abduction: 10 x 10\" each    L Nereida ER red tubing 2 x 15           L nereida behind back stretch w/ strap x 3 min                           Proprioceptive Activities:                                            Manual Therapy:     3 min           GH long axis traction, inferior glide (Gr. 4)                  Functional Activities:                                                       Access Code: [email or web address]  URL: [email or web address]  Date: 09/25/2023  Prepared by: Nallely Klein    Exercises  - Shoulder Flexion Wall Slide with Towel (Mirrored)  - 1 x daily - 7 x weekly - 10 reps - 10 second hold  - Sleeper Stretch  - 1 x daily - 7 x weekly - 10 reps - 10 second hold  - Sidelying Shoulder External Rotation  - 1 x daily - 7 x weekly - 30 reps  - Standing Shoulder Internal Rotation Stretch Behind Back (Mirrored)  - 1 x daily - 7 x weekly - 10 reps - 10 hold  10/25/23: long hold stretching - wall w/ 30\" holds, IR while sidelying w/ L arm behind back x 3 min  Treatment/Session Summary:    >Treatment Assessment:  Persisting limitations in end ROM's - flexion and IR primarily, as expected after repair. Verbal HEP revisions to emphasize longer hold flexion and IR stretching   Communication/Consultation:  None today  Equipment provided today:  None  Recommendations/Intent for next treatment session: Next visit will focus on Post-surgical rehab (AROM, regain all ROM's, strength).    >Total Treatment Billable Duration:  38 minutes  Time

## 2023-10-27 ENCOUNTER — HOSPITAL ENCOUNTER (OUTPATIENT)
Dept: PHYSICAL THERAPY | Age: 38
Setting detail: RECURRING SERIES
Discharge: HOME | End: 2023-10-30
Payer: COMMERCIAL

## 2023-10-27 PROCEDURE — 97110 THERAPEUTIC EXERCISES: CPT

## 2023-10-27 NOTE — PROGRESS NOTES
40 min 42 min  38 min 40 min    L shoulder PROM- flexion, and ER to available ROM and IR to 35 deg. L joseph PROM's flexion, ER, IR: available ROM's.  L shoulder ROM INTO FLEXION, ER AND IR UBE 5 min, #2 resist UBE: 6 min, #2 resist (3 min each forw/bkwd)  UBE: #2 resist, 7 min total UBE: #2 resist, 7 min total L shoulder end-range stretching ER, IR, IR behind back : 15 min L shoulder end ROM into ER, IR and flexion in supine. Then assisted IR behind the back x 15 min    Elbow flexion/ext 3x10 L joseph finger-laced fleixon AAROM x10 x 10 \"  Supine shoulder flexion fingers laced 2x10 L joseph end range stretching ER, IR, horiz add, fleixon L joseph end range stretching. - sustained flexion, sustained IR (10 min)  L joseph flexion wall slides 10 x 10\"  L joseph end-range stretching: flexion, ER, IR, w/ GH inf gliding. Sidelying L joseph ER x 30 - no weight, full range SL ER 3x01, 1#    Wand AAROM in supine-  3x10 ea ER, flexion, scaption, and press up L joseph sleeper stretch 10 x 10\" L shoulder sleeper stretch 10 sec hold x10 Wall slide flexion stretch 10x 10 \"  L sleeper stretch : IR x 3 min, ER x 1 min L joseph PROM's.  ER, IR.  L joseph 2# reclined flexions x 30  L joseph inclined flexion w/ 2# x 30 , seated flexion w/ 2# x 30  Reclined flexion 3x10, 1#    Sub max isometrics into flexion, abd, and ext. 10 sec hold x10 Sidelying L joseph ER x 30  Side lying L shoulder ER 3x10 Reclined active flexion x 30  Supine active flexion x 30  Reclined joseph mid range circumductions 15 c/wccw (1 set no wt , 2 sets w/ 2# med ball Standing joseph extn's black tubing - 30x.  Wall slide flexion stretch: 10 and 30\" holds w/ opphand on shoder for stabilization Wall slide into flexion 10 sec hold x10 with opposite hand on shoulder     Sidelying L joseph horiz abd and abd: 30 each AROM Side lying abduction and horizontal abduction with scapula retraction 3x10 ea Sidelying : ER x 30 , abduction x30, horiz abd x 30  Sidelying : ER x 30 , abduction x30, horiz abd x 30  Sleeper

## 2023-11-01 ENCOUNTER — HOSPITAL ENCOUNTER (OUTPATIENT)
Dept: PHYSICAL THERAPY | Age: 38
Setting detail: RECURRING SERIES
Discharge: HOME | End: 2023-11-04
Payer: COMMERCIAL

## 2023-11-01 PROCEDURE — 97110 THERAPEUTIC EXERCISES: CPT

## 2023-11-01 NOTE — PROGRESS NOTES
Casper Horn  : 1985  Primary: Ashley Robles ([employer])  Secondary:  [address] [facility] [address]  Phone: [phone]  Fax: [phone] Plan Frequency: 2x to 3x/week  Plan of Care/Certification Expiration Date: 23      >PT Visit Info:  Plan Frequency: 2x to 3x/week  Plan of Care/Certification Expiration Date: 23      Visit Count:  21    OUTPATIENT PHYSICAL THERAPY:OP NOTE TYPE: Treatment Note 2023       Episode  }Appt Desk             Treatment Diagnosis:  Pain in Left Shoulder (M25.512)  Stiffness of Left Shoulder, Not elsewhere classified (M25.612)  Medical/Referring Diagnosis:  Shoulder instability, left [M25.312]  Referring Physician:  Anisa Centeno MD MD Orders:  PT Eval and Treat   Date of Onset:  Onset Date: 23     Allergies:   Patient has no allergy information on record. Restrictions/Precautions:  Restrictions/Precautions: Surgical protocol  Required Braces or Orthoses?: Yes  No data recorded   Interventions Planned (Treatment may consist of any combination of the following):    Current Treatment Recommendations: Strengthening; ROM; Manual; Home exercise program; Modalities; Therapeutic activities     >Subjective Comments:  Pt reports no pain and states that the ROM is getting better. >Initial: 0 /10>Post Session:  No increase/10  Medications Last Reviewed:  2023  Updated Objective Findings:     23: L joseph PROM flexion = 123 deg , ER = 40 deg at 45 deg abduction ,IR to 30 deg. 10/4/23: L joseph AROM's: flexion = 137 deg , ER = ,105 IR = 45, Apley ER/IR = C6/sacral PROM's ER = 68 deg, IR = 37 deg, flexion = 140 deg before stretch, 152 deg after)   L joseph strengths:  flexion = 9kg      Abduction =       ER = 5.6 (R = 6.4      IR = 6.5 (R = 9.9kg)   QUickDASH = 20/55  Treatment   TREATMENT:   THERAPEUTIC ACTIVITY: ( see below for minutes):  Therapeutic activities per grid below to improve mobility, strength,

## 2023-11-03 ENCOUNTER — HOSPITAL ENCOUNTER (OUTPATIENT)
Dept: PHYSICAL THERAPY | Age: 38
Setting detail: RECURRING SERIES
Discharge: HOME | End: 2023-11-06
Payer: COMMERCIAL

## 2023-11-03 PROCEDURE — 97110 THERAPEUTIC EXERCISES: CPT

## 2023-11-03 NOTE — PROGRESS NOTES
[email or web address]  Date: 09/25/2023  Prepared by: Oscar Roberto    Exercises  - Shoulder Flexion Wall Slide with Towel (Mirrored)  - 1 x daily - 7 x weekly - 10 reps - 10 second hold  - Sleeper Stretch  - 1 x daily - 7 x weekly - 10 reps - 10 second hold  - Sidelying Shoulder External Rotation  - 1 x daily - 7 x weekly - 30 reps  - Standing Shoulder Internal Rotation Stretch Behind Back (Mirrored)  - 1 x daily - 7 x weekly - 10 reps - 10 hold  10/25/23: long hold stretching - wall w/ 30\" holds, IR while sidelying w/ L arm behind back x 3 min  Treatment/Session Summary:    >Treatment Assessment:  Pt is 12 weeks post op. She tolerated increased resistance without pain and demonstrates good body mechanics. Communication/Consultation:  None today  Equipment provided today:  None  Recommendations/Intent for next treatment session: Next visit will focus on Post-surgical rehab (AROM, regain all ROM's, strength).    >Total Treatment Billable Duration:  40 minutes  Time In: 0845  Time Out: 123 [facility]       Charge Capture  }Post Session Pain  PT Visit Info  [address] [employer] Portal  MD Guidelines  Scanned Media  Benefits  MyChart    Future Appointments   Date Time Provider [address]   11/7/2023  8:45 AM Lori Garay, PT [facility]   11/9/2023  8:45 AM Mellisa Lopez, PT [facility]

## 2023-11-07 ENCOUNTER — HOSPITAL ENCOUNTER (OUTPATIENT)
Dept: PHYSICAL THERAPY | Age: 38
Setting detail: RECURRING SERIES
End: 2023-11-07
Payer: COMMERCIAL

## 2023-11-07 NOTE — PROGRESS NOTES
Ara Trujillo  : 1985  Primary: Rubens Odom Sc  Secondary:  [address] [facility] [address]  Phone: [phone]  Fax: [phone]    PT Visit Info:    No data recorded   OT Visit Info:  No data recorded    OUTPATIENT THERAPY: 2023  Episode  Appt Desk        Ara Trujillo cancelled her appointment for today due to  schedule conflict - staying home to care for ill child . Will plan to follow up next during next appointment.   Thank you,  Rosaline Varghese PT    Future Appointments   Date Time Provider [address]   2023  8:45 AM [facility]

## 2023-11-09 ENCOUNTER — HOSPITAL ENCOUNTER (OUTPATIENT)
Dept: PHYSICAL THERAPY | Age: 38
Setting detail: RECURRING SERIES
Discharge: HOME | End: 2023-11-12
Payer: COMMERCIAL

## 2023-11-09 PROCEDURE — 97110 THERAPEUTIC EXERCISES: CPT

## 2023-11-09 NOTE — PROGRESS NOTES
(Partially met)   Date: 10/11/23 (visit 18)  10/24/23 (visit 19)  10/27/23 (visit 20) 11/1/23 (visit 21) 11/3/23 (visit 22) 11/9/23 (visit 23)    Modalities:                                    Therapeutic Exercise: 42 min  38 min 40 min 41 min 40 min 40 min     UBE: #2 resist, 7 min total L shoulder end-range stretching ER, IR, IR behind back : 15 min L shoulder end ROM into ER, IR and flexion in supine. Then assisted IR behind the back x 15 min L shoulder end ROM into IR and flexion in supine. Then assisted IR behind the back. 15 min L shoulder end ROM into ER, IR and flexion in supine with L scapula stabilized. IR behind the back in side lying x20 min L shoulder end range ER, IR stretching -     L joseph end-range stretching: flexion, ER, IR, w/ GH inf gliding. Sidelying L joseph ER x 30 - no weight, full range SL ER 3x01, 1# SL ER 3x10, 2# SL ER 3x10, 1# Rows-  black tubing x 30     L joseph 2# reclined flexions x 30  L joseph inclined flexion w/ 2# x 30 , seated flexion w/ 2# x 30  Reclined flexion 3x10, 1# Standing flexion 3x10, 3# to 90 degrees Side lying abduction 3x10, 2# IR black tubing (arm at side) x 30     Standing joseph extn's black tubing - 30x. Wall slide flexion stretch: 10 and 30\" holds w/ opphand on shoder for stabilization Wall slide into flexion 10 sec hold x10 with opposite hand on shoulder Rows 3x10 B black band Standing flexion 2# 3x10 in front of mirror ER black tubing arm at side x 30     Standing tubing IR's (arm at side) black tubign x 30 Standing IR stretch w/arm at side - 60 sec hold.  Standing IR stretch behind back with strap Standing IR stretch behind back 20 sec hold x 5 with pulleys Rows x30 black Scaption x 25@ 3#    Standing tubing ER (abducted 60 deg) 3 x 10 (doubled red)   Standing B ER with red CLX band 3x10 Standing ER with single red band 3x10 B ER with red band 3x10 Wall slide shoulder fleixon stretch w/ 10\" holds x 10 L     2# medball on wall joseph circumductions in mid-range flexion: 2 x

## 2024-04-22 ENCOUNTER — APPOINTMENT (RX ONLY)
Dept: URBAN - METROPOLITAN AREA CLINIC 23 | Facility: CLINIC | Age: 39
Setting detail: DERMATOLOGY
End: 2024-04-22

## 2024-04-22 DIAGNOSIS — L57.8 OTHER SKIN CHANGES DUE TO CHRONIC EXPOSURE TO NONIONIZING RADIATION: ICD-10-CM

## 2024-04-22 DIAGNOSIS — B36.0 PITYRIASIS VERSICOLOR: ICD-10-CM | Status: INADEQUATELY CONTROLLED

## 2024-04-22 DIAGNOSIS — Z80.8 FAMILY HISTORY OF MALIGNANT NEOPLASM OF OTHER ORGANS OR SYSTEMS: ICD-10-CM

## 2024-04-22 DIAGNOSIS — D22 MELANOCYTIC NEVI: ICD-10-CM

## 2024-04-22 DIAGNOSIS — Z87.2 PERSONAL HISTORY OF DISEASES OF THE SKIN AND SUBCUTANEOUS TISSUE: ICD-10-CM

## 2024-04-22 DIAGNOSIS — L80 VITILIGO: ICD-10-CM | Status: STABLE

## 2024-04-22 DIAGNOSIS — Z71.89 OTHER SPECIFIED COUNSELING: ICD-10-CM

## 2024-04-22 PROBLEM — D22.5 MELANOCYTIC NEVI OF TRUNK: Status: ACTIVE | Noted: 2024-04-22

## 2024-04-22 PROBLEM — L81.9 DISORDER OF PIGMENTATION, UNSPECIFIED: Status: ACTIVE | Noted: 2024-04-22

## 2024-04-22 PROCEDURE — 99214 OFFICE O/P EST MOD 30 MIN: CPT

## 2024-04-22 PROCEDURE — ? PRESCRIPTION MEDICATION MANAGEMENT

## 2024-04-22 PROCEDURE — ? PRESCRIPTION

## 2024-04-22 PROCEDURE — ? COUNSELING

## 2024-04-22 PROCEDURE — ? PHOTO-DOCUMENTATION

## 2024-04-22 PROCEDURE — ? KOH PREP

## 2024-04-22 RX ORDER — RUXOLITINIB 15 MG/G
CREAM TOPICAL
Qty: 60 | Refills: 2 | Status: ERX | COMMUNITY
Start: 2024-04-22

## 2024-04-22 RX ORDER — KETOCONAZOLE 20 MG/ML
SHAMPOO, SUSPENSION TOPICAL
Qty: 120 | Refills: 6 | Status: ERX | COMMUNITY
Start: 2024-04-22

## 2024-04-22 RX ADMIN — RUXOLITINIB: 15 CREAM TOPICAL at 00:00

## 2024-04-22 RX ADMIN — KETOCONAZOLE: 20 SHAMPOO, SUSPENSION TOPICAL at 00:00

## 2024-04-22 ASSESSMENT — LOCATION ZONE DERM
LOCATION ZONE: ARM
LOCATION ZONE: AXILLAE
LOCATION ZONE: FACE
LOCATION ZONE: TRUNK

## 2024-04-22 ASSESSMENT — LOCATION SIMPLE DESCRIPTION DERM
LOCATION SIMPLE: RIGHT SHOULDER
LOCATION SIMPLE: LEFT AXILLARY VAULT
LOCATION SIMPLE: RIGHT FOREHEAD
LOCATION SIMPLE: ABDOMEN
LOCATION SIMPLE: RIGHT UPPER BACK
LOCATION SIMPLE: RIGHT AXILLARY VAULT

## 2024-04-22 ASSESSMENT — LOCATION DETAILED DESCRIPTION DERM
LOCATION DETAILED: RIGHT SUPERIOR FOREHEAD
LOCATION DETAILED: RIGHT AXILLARY VAULT
LOCATION DETAILED: LEFT RIB CAGE
LOCATION DETAILED: RIGHT MEDIAL UPPER BACK
LOCATION DETAILED: RIGHT POSTERIOR SHOULDER
LOCATION DETAILED: LEFT AXILLARY VAULT
LOCATION DETAILED: XIPHOID

## 2024-04-22 ASSESSMENT — BSA RASH: BSA RASH: 1

## 2024-04-22 ASSESSMENT — SEVERITY ASSESSMENT: SEVERITY: MILD

## 2024-04-22 ASSESSMENT — BSA VITILIGO: % BODY COVERED IN VITILIGO: 3

## 2024-04-22 NOTE — PROCEDURE: PHOTO-DOCUMENTATION
Detail Level: Zone
Detail Level: Detailed
Details (Free Text): Present for years, will continue to follow.

## 2024-04-22 NOTE — PROCEDURE: PRESCRIPTION MEDICATION MANAGEMENT
Render In Strict Bullet Format?: No
Samples Given: Opzelura twice daily
Detail Level: Simple
Initiate Treatment: Ketoconazole shampoo

## 2024-07-29 ENCOUNTER — APPOINTMENT (RX ONLY)
Dept: URBAN - METROPOLITAN AREA CLINIC 23 | Facility: CLINIC | Age: 39
Setting detail: DERMATOLOGY
End: 2024-07-29

## 2024-07-29 DIAGNOSIS — L57.8 OTHER SKIN CHANGES DUE TO CHRONIC EXPOSURE TO NONIONIZING RADIATION: ICD-10-CM | Status: STABLE

## 2024-07-29 DIAGNOSIS — L80 VITILIGO: ICD-10-CM

## 2024-07-29 PROBLEM — L81.9 DISORDER OF PIGMENTATION, UNSPECIFIED: Status: ACTIVE | Noted: 2024-07-29

## 2024-07-29 PROCEDURE — 99214 OFFICE O/P EST MOD 30 MIN: CPT

## 2024-07-29 PROCEDURE — ? PHOTO-DOCUMENTATION

## 2024-07-29 PROCEDURE — ? COUNSELING

## 2024-07-29 PROCEDURE — ? PRESCRIPTION MEDICATION MANAGEMENT

## 2024-07-29 PROCEDURE — ? ADDITIONAL NOTES

## 2024-07-29 ASSESSMENT — LOCATION ZONE DERM
LOCATION ZONE: FACE
LOCATION ZONE: AXILLAE

## 2024-07-29 ASSESSMENT — LOCATION DETAILED DESCRIPTION DERM
LOCATION DETAILED: RIGHT SUPERIOR FOREHEAD
LOCATION DETAILED: LEFT AXILLARY VAULT
LOCATION DETAILED: RIGHT AXILLARY VAULT

## 2024-07-29 ASSESSMENT — LOCATION SIMPLE DESCRIPTION DERM
LOCATION SIMPLE: RIGHT FOREHEAD
LOCATION SIMPLE: RIGHT AXILLARY VAULT
LOCATION SIMPLE: LEFT AXILLARY VAULT

## 2024-07-29 NOTE — PROCEDURE: PRESCRIPTION MEDICATION MANAGEMENT
Render In Strict Bullet Format?: No
Detail Level: Simple
Plan: No improvement noted. She does admit to only using once daily. The patient will try treating bid in the fall and we can plan for a 6 month follow-up.